# Patient Record
Sex: MALE | Employment: UNEMPLOYED | URBAN - METROPOLITAN AREA
[De-identification: names, ages, dates, MRNs, and addresses within clinical notes are randomized per-mention and may not be internally consistent; named-entity substitution may affect disease eponyms.]

---

## 2017-10-12 ENCOUNTER — TRANSFERRED RECORDS (OUTPATIENT)
Dept: HEALTH INFORMATION MANAGEMENT | Facility: CLINIC | Age: 62
End: 2017-10-12

## 2017-12-15 ENCOUNTER — TRANSFERRED RECORDS (OUTPATIENT)
Dept: HEALTH INFORMATION MANAGEMENT | Facility: CLINIC | Age: 62
End: 2017-12-15

## 2017-12-28 ENCOUNTER — TRANSFERRED RECORDS (OUTPATIENT)
Dept: HEALTH INFORMATION MANAGEMENT | Facility: CLINIC | Age: 62
End: 2017-12-28

## 2019-02-25 ENCOUNTER — TRANSFERRED RECORDS (OUTPATIENT)
Dept: HEALTH INFORMATION MANAGEMENT | Facility: CLINIC | Age: 64
End: 2019-02-25

## 2019-03-12 ENCOUNTER — TRANSFERRED RECORDS (OUTPATIENT)
Dept: HEALTH INFORMATION MANAGEMENT | Facility: CLINIC | Age: 64
End: 2019-03-12

## 2019-04-24 ENCOUNTER — TRANSFERRED RECORDS (OUTPATIENT)
Dept: HEALTH INFORMATION MANAGEMENT | Facility: CLINIC | Age: 64
End: 2019-04-24

## 2019-04-26 ENCOUNTER — PRE VISIT (OUTPATIENT)
Dept: UROLOGY | Facility: CLINIC | Age: 64
End: 2019-04-26

## 2019-04-26 NOTE — TELEPHONE ENCOUNTER
MEDICAL RECORDS REQUEST   [facility]  Urology Clinic  [address] [place] [address]  PHONE: [phone]  Fax: [phone]        FUTURE VISIT INFORMATION                                                   Reinaldo Delacruz : 1955 scheduled for future visit at [facility]    APPOINTMENT INFORMATION:    Date: 19 8:20AM    Provider:  Arthur Mederos MD    Reason for Visit/Diagnosis: Bladder Stone    REFERRAL INFORMATION:    Referring provider:  Elias Su    Specialty: MD    Referring providers clinic:  [facility] contact number:  [phone]    RECORDS REQUESTED FOR VISIT                                                     NOTES  STATUS/DETAILS   OFFICE NOTE from referring provider  Yes   OFFICE NOTE from other specialist  Yes   DISCHARGE SUMMARY from hospital  No   DISCHARGE REPORT from the ER  No   OPERATIVE REPORT  No   MEDICATION LIST  Yes     PRE-VISIT CHECKLIST      Record collection complete Yes- Blanco recs in [facility]   Recs received from [facility]  Images in [facility] PACS    Appointment appropriately scheduled           (right time/right provider) Yes   MyChart activation If no, please explain: In process   Questionnaire complete If no, please explain: In process     Completed by: Andreea Wheeler

## 2019-04-30 ENCOUNTER — DOCUMENTATION ONLY (OUTPATIENT)
Dept: CARE COORDINATION | Facility: CLINIC | Age: 64
End: 2019-04-30

## 2019-05-02 ENCOUNTER — TELEPHONE (OUTPATIENT)
Dept: UROLOGY | Facility: CLINIC | Age: 64
End: 2019-05-02

## 2019-05-02 NOTE — TELEPHONE ENCOUNTER
Spoke to patients wife to inform her that Dr. Mederos does do HOLEP surgery.    Cathryn Cee RN, BSN  Care Coordinator- Reconstructive Urology

## 2019-05-02 NOTE — TELEPHONE ENCOUNTER
[facility] Call Center    Phone Message    May a detailed message be left on voicemail: yes    Reason for Call: Other: Pt's wife called to see if Dr. Mederos does holed laser enucleation of prostate. The pt was previously seen at [facility], and they do not offer the procedure. He currently has an appointment scheduled with Dr. Mederos scheduled for 05/17.    Action Taken: Message routed to:  [facility] ([facility] Urology

## 2019-05-17 ENCOUNTER — OFFICE VISIT (OUTPATIENT)
Dept: UROLOGY | Facility: CLINIC | Age: 64
End: 2019-05-17
Payer: COMMERCIAL

## 2019-05-17 VITALS
SYSTOLIC BLOOD PRESSURE: 137 MMHG | HEART RATE: 83 BPM | HEIGHT: 71 IN | BODY MASS INDEX: 35.42 KG/M2 | DIASTOLIC BLOOD PRESSURE: 90 MMHG | WEIGHT: 253 LBS

## 2019-05-17 DIAGNOSIS — R97.20 ELEVATED PROSTATE SPECIFIC ANTIGEN (PSA): Primary | ICD-10-CM

## 2019-05-17 DIAGNOSIS — Q61.3 POLYCYSTIC KIDNEY: ICD-10-CM

## 2019-05-17 LAB
ALBUMIN UR-MCNC: NEGATIVE MG/DL
APPEARANCE UR: ABNORMAL
BILIRUB UR QL STRIP: NEGATIVE
COLOR UR AUTO: YELLOW
GLUCOSE UR STRIP-MCNC: NEGATIVE MG/DL
HGB UR QL STRIP: ABNORMAL
KETONES UR STRIP-MCNC: NEGATIVE MG/DL
LEUKOCYTE ESTERASE UR QL STRIP: NEGATIVE
MUCOUS THREADS #/AREA URNS LPF: PRESENT /LPF
NITRATE UR QL: NEGATIVE
PH UR STRIP: 5 PH (ref 5–7)
RBC #/AREA URNS AUTO: 65 /HPF (ref 0–2)
SOURCE: ABNORMAL
SP GR UR STRIP: 1.02 (ref 1–1.03)
UROBILINOGEN UR STRIP-MCNC: 0 MG/DL (ref 0–2)
WBC #/AREA URNS AUTO: 2 /HPF (ref 0–5)

## 2019-05-17 RX ORDER — TAMSULOSIN HYDROCHLORIDE 0.4 MG/1
0.4 CAPSULE ORAL DAILY
COMMUNITY
Start: 2016-12-19 | End: 2019-07-23

## 2019-05-17 ASSESSMENT — ENCOUNTER SYMPTOMS
HYPERTENSION: 0
FLANK PAIN: 1
TROUBLE SWALLOWING: 0
DIFFICULTY URINATING: 1
HYPOTENSION: 0
BLOOD IN STOOL: 0
HEMATURIA: 1
WHEEZING: 0
SPUTUM PRODUCTION: 0
ABDOMINAL PAIN: 0
SHORTNESS OF BREATH: 1
HEARTBURN: 0
RECTAL PAIN: 0
SINUS CONGESTION: 0
DYSURIA: 1
HEMOPTYSIS: 0
VOMITING: 0
SYNCOPE: 0
JAUNDICE: 0
SORE THROAT: 0
DIARRHEA: 0
COUGH DISTURBING SLEEP: 0
NAUSEA: 1
HOARSE VOICE: 0
BOWEL INCONTINENCE: 0
SNORES LOUDLY: 1
SINUS PAIN: 0
EXERCISE INTOLERANCE: 0
LEG PAIN: 0
SLEEP DISTURBANCES DUE TO BREATHING: 1
ORTHOPNEA: 1
POSTURAL DYSPNEA: 1
COUGH: 0
DYSPNEA ON EXERTION: 0
BLOATING: 0
PALPITATIONS: 0
CONSTIPATION: 0
LIGHT-HEADEDNESS: 0

## 2019-05-17 ASSESSMENT — PAIN SCALES - GENERAL: PAINLEVEL: MILD PAIN (3)

## 2019-05-17 ASSESSMENT — MIFFLIN-ST. JEOR: SCORE: 1964.73

## 2019-05-17 NOTE — PATIENT INSTRUCTIONS
Schedule an MRI.    Dr. Mederos will call you with the results.    It was a pleasure meeting with you today.  Thank you for allowing me and my team the privilege of caring for you today.  YOU are the reason we are here, and I truly hope we provided you with the excellent service you deserve.  Please let us know if there is anything else we can do for you so that we can be sure you are leaving completely satisfied with your care experience.

## 2019-05-17 NOTE — PROGRESS NOTES
Chief Complaint:   BPH/Hematuria         History of Present Illness:    Reinaldo Delacruz is a very pleasant 64 year old male who presents with a history of large gland BPH, hematuria and bladder stone.  He has had intermittant hematuria and a known bladder stone since 12/2017.  He had a cystolitholopaxy which treated his initial symptoms around that time and initially did well but stone and hematuria recurred 1/19.  Was reworked up and found to have a 115 gm prostate as well as a 1.5 cm bladder stone, suspected to be a recurrence.  Now being referred for consideration of HoLEP/Cystolitholopaxy.    PSA has historically ranged from 6's - 7's.   Has never had a prostate biopsy, denies family hx of prostate cancer.      He is on flomax.  Daytime symptoms are not very bothersome but he does have nocturia and in the evening often struggles with hesitancy/retention that prevents him from getting rest.  Does have intermittent bouts of dysuria.    Mother has hx of polycystic kidneys and he does as well.  Has never discussed this with anyone.      He is interested in the most definitive option available to address his large gland BPH and symptoms both in the short and long term.    AUA SS 16 (Did not answer QOL)         Past Medical History:   Polycystic Kidneys         Past Surgical History:   None         Medications     Current Outpatient Medications   Medication     tamsulosin (FLOMAX) 0.4 MG capsule     No current facility-administered medications for this visit.             Family History:   Kidney problem in father (congenital unknown)  Mother with polycystic kidneys         Social History:     Social History     Socioeconomic History     Marital status:      Spouse name: Not on file     Number of children: Not on file     Years of education: Not on file     Highest education level: Not on file   Occupational History     Not on file   Social Needs     Financial resource strain: Not on file     Food  "insecurity:     Worry: Not on file     Inability: Not on file     Transportation needs:     Medical: Not on file     Non-medical: Not on file   Tobacco Use     Smoking status: Never Smoker     Smokeless tobacco: Never Used   Substance and Sexual Activity     Alcohol use: Not Currently     Drug use: Never     Sexual activity: Not on file   Lifestyle     Physical activity:     Days per week: Not on file     Minutes per session: Not on file     Stress: Not on file   Relationships     Social connections:     Talks on phone: Not on file     Gets together: Not on file     Attends Hoahaoism service: Not on file     Active member of club or organization: Not on file     Attends meetings of clubs or organizations: Not on file     Relationship status: Not on file     Intimate partner violence:     Fear of current or ex partner: Not on file     Emotionally abused: Not on file     Physically abused: Not on file     Forced sexual activity: Not on file   Other Topics Concern     Parent/sibling w/ CABG, MI or angioplasty before 65F 55M? No   Social History Narrative     Not on file            Allergies:   Patient has no known allergies.         Review of Systems:  From intake questionnaire   Negative 14 system review except as noted on HPI, nurse's note.         Physical Exam:   Patient is a 64 year old  male   Vitals: Blood pressure 137/90, pulse 83, height 1.803 m (5' 11\"), weight 114.8 kg (253 lb).  General Appearance Adult: Alert, no acute distress, oriented  HENT: throat/mouth:normal, good dentition  Neck: No adenopathy,masses or thyromegaly  Lungs: no respiratory distress, or pursed lip breathing  Heart: No obvious jugular venous distension present  Abdomen: soft, nontender, no organomegaly or masses, Body mass index is 35.29 kg/m .  Lymphatics: No cervical or supraclavicular adenopathy  Musculoskeltal: extremities normal, no peripheral edema  Skin: no suspicious lesions or rashes  Neuro: Alert, oriented, speech and " mentation normal  Psych: affect and mood normal  Gait: Normal  :Prostate > 60 gm, soft anodular     Uroflow and Post-Void Residual by Bladder Scan   PVR: 16      Labs and Pathology:    I personally reviewed all applicable laboratory data and went over findings with patient  Significant for:        Imaging:    I personally reviewed all applicable imaging and went over findings with patient.  Significant for:  Urine cytology 4/19 negative for high grade cells  Cr 3.19 - 1.0           Assessment and Plan:     Assessment:64 year old male with bothersome LUTS and lagre prostate with bladder stone    Plan:  After discussion of medical and surgical treatments for BPH including alpha blockers, anticholinergics, transurethral resection, laser ablation, enucleation and simple prostatectomy, Mr. Delacruz has decided to proceed with cystolitholopaxy and Holmium Laser Enucleation of the Prostate (HoLEP).    We discussed the associated risks of this procedure included but not limited to the following:  -Bleeding, potentially significant enough to require clot evacuation and blood transfusion  -Infection, for which we will plan to treat preoperatively based on targeted antibiotic therapy  -Damage to the bladder, urethra and penis including the risk of urethral stricture and bladder neck contracture  -Risk of incidentally discovered prostate cancer.  We discussed that this would not preclude him from further therapy though it could prolong recovery and potentially increase risk of complications associated with cancer treatment.  Further preoperative workup to assess for prostate cancer prior to surgery was offered but patient deferred.  -Risk of retrograde ejaculation which would be expected to occur in the majority if not all men after HoLEP  -Risk of urinary incontinence.  We discussed that in the majority of men this is a transient process that is generally self limited to the first 6-12 weeks after surgery though could take  longer to resolve depending on baseline bladder instability.  -Risk of postperative urinary retention though in published series HoLEP has been found to be associated with high success rates of achieving spontaneous voiding even in men with underactive and atonic bladders.  -We will proceed with preoperative clearance with preference to minimize all anticoagulation as deemed acceptable by his primary care provider.     We will obtain a prostate MRI prior, if there is evidence of a suspicious lesion would obtain prostate biopsy prior to proceeding with surgery    Suspect also that he may have genetic polycystic kidney disease (ADPKD), will obtian MRA brain to rule out berry aneurysm.          IArthur saw and evaluated this patient and agree with the plan as stated above.  I personally performed all listed procedures.     CC:  No primary care provider on file.      Answers for HPI/ROS submitted by the patient on 5/17/2019   General Symptoms: No  Skin Symptoms: No  HENT Symptoms: Yes  EYE SYMPTOMS: No  HEART SYMPTOMS: Yes  LUNG SYMPTOMS: Yes  INTESTINAL SYMPTOMS: Yes  URINARY SYMPTOMS: Yes  REPRODUCTIVE SYMPTOMS: No  SKELETAL SYMPTOMS: No  BLOOD SYMPTOMS: No  NERVOUS SYSTEM SYMPTOMS: No  MENTAL HEALTH SYMPTOMS: No  Ear pain: No  Ear discharge: No  Hearing loss: No  Tinnitus: No  Nosebleeds: No  Congestion: No  Sinus pain: No  Trouble swallowing: No   Voice hoarseness: No  Mouth sores: No  Sore throat: No  Tooth pain: No  Gum tenderness: No  Bleeding gums: No  Cough: No  Sputum or phlegm: No  Coughing up blood: No  Difficulty breating or shortness of breath: Yes  Snoring: Yes  Wheezing: No  Difficulty breathing on exertion: No  Nighttime Cough: No  Difficulty breathing when lying flat: Yes  Chest pain or pressure: No  Fast or irregular heartbeat: No  Pain in legs with walking: No  Trouble breathing while lying down: Yes  Fingers or toes appear blue: No  High blood pressure: No  Low blood pressure:  No  Fainting: No  Murmurs: No  Pacemaker: No  Varicose veins: No  Edema or swelling: Yes  Wake up at night with shortness of breath: Yes  Light-headedness: No  Exercise intolerance: No  Heart burn or indigestion: No  Nausea: Yes  Vomiting: No  Abdominal pain: No  Bloating: No  Constipation: No  Diarrhea: No  Blood in stool: No  Black stools: No  Rectal or Anal pain: No  Fecal incontinence: No  Yellowing of skin or eyes: No  Vomit with blood: No  Change in stools: No  Trouble holding urine or incontinence: No  Pain or burning: Yes  Trouble starting or stopping: No  Increased frequency of urination: Yes  Blood in urine: Yes  Decreased frequency of urination: No  Frequent nighttime urination: Yes  Flank pain: Yes  Difficulty emptying bladder: Yes

## 2019-05-17 NOTE — LETTER
5/17/2019     RE: Reinaldo Delacruz  [address] [place] [address]     Dear Colleague,    Thank you for referring your patient, Reinaldo Delacruz, to the [facility] AND [facility] at [facility]. Please see a copy of my visit note below.            Chief Complaint:   BPH/Hematuria         History of Present Illness:    Reinaldo Delacruz is a very pleasant 64 year old male who presents with a history of large gland BPH, hematuria and bladder stone.  He has had intermittant hematuria and a known bladder stone since 12/2017.  He had a cystolitholopaxy which treated his initial symptoms around that time and initially did well but stone and hematuria recurred 1/19.  Was reworked up and found to have a 115 gm prostate as well as a 1.5 cm bladder stone, suspected to be a recurrence.  Now being referred for consideration of HoLEP/Cystolitholopaxy.    PSA has historically ranged from 6's - 7's.   Has never had a prostate biopsy, denies family hx of prostate cancer.      He is on flomax.  Daytime symptoms are not very bothersome but he does have nocturia and in the evening often struggles with hesitancy/retention that prevents him from getting rest.  Does have intermittent bouts of dysuria.    Mother has hx of polycystic kidneys and he does as well.  Has never discussed this with anyone.      He is interested in the most definitive option available to address his large gland BPH and symptoms both in the short and long term.    AUA SS 16 (Did not answer QOL)         Past Medical History:   Polycystic Kidneys         Past Surgical History:   None         Medications     Current Outpatient Medications   Medication     tamsulosin (FLOMAX) 0.4 MG capsule     No current facility-administered medications for this visit.             Family History:   Kidney problem in father (congenital unknown)  Mother with polycystic kidneys         Social History:     Social History  "    Socioeconomic History     Marital status:      Spouse name: Not on file     Number of children: Not on file     Years of education: Not on file     Highest education level: Not on file   Occupational History     Not on file   Social Needs     Financial resource strain: Not on file     Food insecurity:     Worry: Not on file     Inability: Not on file     Transportation needs:     Medical: Not on file     Non-medical: Not on file   Tobacco Use     Smoking status: Never Smoker     Smokeless tobacco: Never Used   Substance and Sexual Activity     Alcohol use: Not Currently     Drug use: Never     Sexual activity: Not on file   Lifestyle     Physical activity:     Days per week: Not on file     Minutes per session: Not on file     Stress: Not on file   Relationships     Social connections:     Talks on phone: Not on file     Gets together: Not on file     Attends Bahai service: Not on file     Active member of club or organization: Not on file     Attends meetings of clubs or organizations: Not on file     Relationship status: Not on file     Intimate partner violence:     Fear of current or ex partner: Not on file     Emotionally abused: Not on file     Physically abused: Not on file     Forced sexual activity: Not on file   Other Topics Concern     Parent/sibling w/ CABG, MI or angioplasty before 65F 55M? No   Social History Narrative     Not on file            Allergies:   Patient has no known allergies.         Review of Systems:  From intake questionnaire   Negative 14 system review except as noted on HPI, nurse's note.         Physical Exam:   Patient is a 64 year old  male   Vitals: Blood pressure 137/90, pulse 83, height 1.803 m (5' 11\"), weight 114.8 kg (253 lb).  General Appearance Adult: Alert, no acute distress, oriented  HENT: throat/mouth:normal, good dentition  Neck: No adenopathy,masses or thyromegaly  Lungs: no respiratory distress, or pursed lip breathing  Heart: No obvious jugular " venous distension present  Abdomen: soft, nontender, no organomegaly or masses, Body mass index is 35.29 kg/m .  Lymphatics: No cervical or supraclavicular adenopathy  Musculoskeltal: extremities normal, no peripheral edema  Skin: no suspicious lesions or rashes  Neuro: Alert, oriented, speech and mentation normal  Psych: affect and mood normal  Gait: Normal  :Prostate > 60 gm, soft anodular     Uroflow and Post-Void Residual by Bladder Scan   PVR: 16      Labs and Pathology:    I personally reviewed all applicable laboratory data and went over findings with patient  Significant for:        Imaging:    I personally reviewed all applicable imaging and went over findings with patient.  Significant for:  Urine cytology 4/19 negative for high grade cells  Cr 3.19 - 1.0           Assessment and Plan:     Assessment:64 year old male with bothersome LUTS and lagre prostate with bladder stone    Plan:  After discussion of medical and surgical treatments for BPH including alpha blockers, anticholinergics, transurethral resection, laser ablation, enucleation and simple prostatectomy, Mr. Delacruz has decided to proceed with cystolitholopaxy and Holmium Laser Enucleation of the Prostate (HoLEP).    We discussed the associated risks of this procedure included but not limited to the following:  -Bleeding, potentially significant enough to require clot evacuation and blood transfusion  -Infection, for which we will plan to treat preoperatively based on targeted antibiotic therapy  -Damage to the bladder, urethra and penis including the risk of urethral stricture and bladder neck contracture  -Risk of incidentally discovered prostate cancer.  We discussed that this would not preclude him from further therapy though it could prolong recovery and potentially increase risk of complications associated with cancer treatment.  Further preoperative workup to assess for prostate cancer prior to surgery was offered but patient  deferred.  -Risk of retrograde ejaculation which would be expected to occur in the majority if not all men after HoLEP  -Risk of urinary incontinence.  We discussed that in the majority of men this is a transient process that is generally self limited to the first 6-12 weeks after surgery though could take longer to resolve depending on baseline bladder instability.  -Risk of postperative urinary retention though in published series HoLEP has been found to be associated with high success rates of achieving spontaneous voiding even in men with underactive and atonic bladders.  -We will proceed with preoperative clearance with preference to minimize all anticoagulation as deemed acceptable by his primary care provider.     We will obtain a prostate MRI prior, if there is evidence of a suspicious lesion would obtain prostate biopsy prior to proceeding with surgery    Suspect also that he may have genetic polycystic kidney disease (ADPKD), will obtian MRA brain to rule out berry aneurysm.          IArthur saw and evaluated this patient and agree with the plan as stated above.  I personally performed all listed procedures.     CC:  No primary care provider on file.      Answers for HPI/ROS submitted by the patient on 5/17/2019   General Symptoms: No  Skin Symptoms: No  HENT Symptoms: Yes  EYE SYMPTOMS: No  HEART SYMPTOMS: Yes  LUNG SYMPTOMS: Yes  INTESTINAL SYMPTOMS: Yes  URINARY SYMPTOMS: Yes  REPRODUCTIVE SYMPTOMS: No  SKELETAL SYMPTOMS: No  BLOOD SYMPTOMS: No  NERVOUS SYSTEM SYMPTOMS: No  MENTAL HEALTH SYMPTOMS: No  Ear pain: No  Ear discharge: No  Hearing loss: No  Tinnitus: No  Nosebleeds: No  Congestion: No  Sinus pain: No  Trouble swallowing: No   Voice hoarseness: No  Mouth sores: No  Sore throat: No  Tooth pain: No  Gum tenderness: No  Bleeding gums: No  Cough: No  Sputum or phlegm: No  Coughing up blood: No  Difficulty breating or shortness of breath: Yes  Snoring: Yes  Wheezing: No  Difficulty  breathing on exertion: No  Nighttime Cough: No  Difficulty breathing when lying flat: Yes  Chest pain or pressure: No  Fast or irregular heartbeat: No  Pain in legs with walking: No  Trouble breathing while lying down: Yes  Fingers or toes appear blue: No  High blood pressure: No  Low blood pressure: No  Fainting: No  Murmurs: No  Pacemaker: No  Varicose veins: No  Edema or swelling: Yes  Wake up at night with shortness of breath: Yes  Light-headedness: No  Exercise intolerance: No  Heart burn or indigestion: No  Nausea: Yes  Vomiting: No  Abdominal pain: No  Bloating: No  Constipation: No  Diarrhea: No  Blood in stool: No  Black stools: No  Rectal or Anal pain: No  Fecal incontinence: No  Yellowing of skin or eyes: No  Vomit with blood: No  Change in stools: No  Trouble holding urine or incontinence: No  Pain or burning: Yes  Trouble starting or stopping: No  Increased frequency of urination: Yes  Blood in urine: Yes  Decreased frequency of urination: No  Frequent nighttime urination: Yes  Flank pain: Yes  Difficulty emptying bladder: Yes    Again, thank you for allowing me to participate in the care of your patient.      Sincerely,    Arthur Mederos MD

## 2019-05-17 NOTE — NURSING NOTE
"No chief complaint on file.      Blood pressure 137/90, pulse 83, height 1.803 m (5' 11\"), weight 114.8 kg (253 lb). Body mass index is 35.29 kg/m .    There is no problem list on file for this patient.      No Known Allergies    Current Outpatient Medications   Medication Sig Dispense Refill     tamsulosin (FLOMAX) 0.4 MG capsule Take 0.4 mg by mouth         Social History     Tobacco Use     Smoking status: Never Smoker     Smokeless tobacco: Never Used   Substance Use Topics     Alcohol use: Not Currently     Drug use: Never       CHIOMA Sanchez  5/17/2019  8:06 AM     "

## 2019-05-20 ENCOUNTER — HOSPITAL ENCOUNTER (OUTPATIENT)
Dept: MRI IMAGING | Facility: CLINIC | Age: 64
Discharge: HOME | End: 2019-05-20
Attending: UROLOGY | Admitting: UROLOGY
Payer: COMMERCIAL

## 2019-05-20 DIAGNOSIS — N21.0 BLADDER STONE: Primary | ICD-10-CM

## 2019-05-20 DIAGNOSIS — R97.20 ELEVATED PROSTATE SPECIFIC ANTIGEN (PSA): ICD-10-CM

## 2019-05-20 PROCEDURE — A9585 GADOBUTROL INJECTION: HCPCS | Performed by: RADIOLOGY

## 2019-05-20 PROCEDURE — 72197 MRI PELVIS W/O & W/DYE: CPT

## 2019-05-20 PROCEDURE — 25500064 ZZH RX 255 OP 636: Performed by: RADIOLOGY

## 2019-05-20 RX ORDER — GADOBUTROL 604.72 MG/ML
10 INJECTION INTRAVENOUS ONCE
Status: COMPLETED | OUTPATIENT
Start: 2019-05-20 | End: 2019-05-20

## 2019-05-20 RX ADMIN — GADOBUTROL 10 ML: 604.72 INJECTION INTRAVENOUS at 15:29

## 2019-05-21 ENCOUNTER — TELEPHONE (OUTPATIENT)
Dept: UROLOGY | Facility: CLINIC | Age: 64
End: 2019-05-21

## 2019-05-21 NOTE — TELEPHONE ENCOUNTER
Patient is scheduled for surgery with Dr. Diaz      Spoke or left message with: spouse    Date of Surgery: 6/6/19    Location: [facility] OR    Informed patient they will need an adult  yes    Pre-op with surgeon (if applicable): n/a    H&P: Scheduled with pcp    Additional imaging/appointments: n/a    Surgery packet: mailed 5/21/19     Additional comments: n/a

## 2019-05-22 ENCOUNTER — TRANSFERRED RECORDS (OUTPATIENT)
Dept: HEALTH INFORMATION MANAGEMENT | Facility: CLINIC | Age: 64
End: 2019-05-22

## 2019-05-22 ENCOUNTER — MYC MEDICAL ADVICE (OUTPATIENT)
Dept: UROLOGY | Facility: CLINIC | Age: 64
End: 2019-05-22

## 2019-05-23 ENCOUNTER — PATIENT OUTREACH (OUTPATIENT)
Dept: UROLOGY | Facility: CLINIC | Age: 64
End: 2019-05-23

## 2019-05-23 NOTE — PROGRESS NOTES
Pre-op watch for results.      Hayden Kasper,   Yesterday night  I  had appointment with Dr Leoncio Wood / [facility].

## 2019-05-31 DIAGNOSIS — N39.0 URINARY TRACT INFECTION: Primary | ICD-10-CM

## 2019-05-31 RX ORDER — CIPROFLOXACIN 500 MG/1
500 TABLET, FILM COATED ORAL 2 TIMES DAILY
Qty: 14 TABLET | Refills: 0 | Status: ON HOLD | OUTPATIENT
Start: 2019-05-31 | End: 2019-06-07

## 2019-06-06 ENCOUNTER — ANESTHESIA EVENT (OUTPATIENT)
Dept: SURGERY | Facility: CLINIC | Age: 64
End: 2019-06-06
Payer: COMMERCIAL

## 2019-06-06 ENCOUNTER — HOSPITAL ENCOUNTER (OUTPATIENT)
Facility: CLINIC | Age: 64
Discharge: HOME | End: 2019-06-07
Attending: UROLOGY | Admitting: UROLOGY
Payer: COMMERCIAL

## 2019-06-06 ENCOUNTER — ANESTHESIA (OUTPATIENT)
Dept: SURGERY | Facility: CLINIC | Age: 64
End: 2019-06-06
Payer: COMMERCIAL

## 2019-06-06 DIAGNOSIS — N39.0 URINARY TRACT INFECTION: ICD-10-CM

## 2019-06-06 DIAGNOSIS — R39.14 BENIGN PROSTATIC HYPERPLASIA WITH INCOMPLETE BLADDER EMPTYING: Primary | ICD-10-CM

## 2019-06-06 DIAGNOSIS — N40.1 BENIGN PROSTATIC HYPERPLASIA WITH INCOMPLETE BLADDER EMPTYING: Primary | ICD-10-CM

## 2019-06-06 PROBLEM — N40.0 BPH (BENIGN PROSTATIC HYPERPLASIA): Status: ACTIVE | Noted: 2019-06-06

## 2019-06-06 LAB
ABO + RH BLD: NORMAL
ABO + RH BLD: NORMAL
ANION GAP SERPL CALCULATED.3IONS-SCNC: 6 MMOL/L (ref 3–14)
BLD GP AB SCN SERPL QL: NORMAL
BLOOD BANK CMNT PATIENT-IMP: NORMAL
BUN SERPL-MCNC: 23 MG/DL (ref 7–30)
CALCIUM SERPL-MCNC: 8.3 MG/DL (ref 8.5–10.1)
CHLORIDE SERPL-SCNC: 110 MMOL/L (ref 94–109)
CO2 SERPL-SCNC: 25 MMOL/L (ref 20–32)
CREAT SERPL-MCNC: 1.13 MG/DL (ref 0.66–1.25)
ERYTHROCYTE [DISTWIDTH] IN BLOOD BY AUTOMATED COUNT: 13.6 % (ref 10–15)
GFR SERPL CREATININE-BSD FRML MDRD: 69 ML/MIN/{1.73_M2}
GLUCOSE BLDC GLUCOMTR-MCNC: 100 MG/DL (ref 70–99)
GLUCOSE SERPL-MCNC: 120 MG/DL (ref 70–99)
HCT VFR BLD AUTO: 37 % (ref 40–53)
HGB BLD-MCNC: 12.4 G/DL (ref 13.3–17.7)
MCH RBC QN AUTO: 29.9 PG (ref 26.5–33)
MCHC RBC AUTO-ENTMCNC: 33.5 G/DL (ref 31.5–36.5)
MCV RBC AUTO: 89 FL (ref 78–100)
PLATELET # BLD AUTO: 217 10E9/L (ref 150–450)
POTASSIUM SERPL-SCNC: 4.5 MMOL/L (ref 3.4–5.3)
RBC # BLD AUTO: 4.15 10E12/L (ref 4.4–5.9)
SODIUM SERPL-SCNC: 141 MMOL/L (ref 133–144)
SPECIMEN EXP DATE BLD: NORMAL
WBC # BLD AUTO: 9.5 10E9/L (ref 4–11)

## 2019-06-06 PROCEDURE — 88305 TISSUE EXAM BY PATHOLOGIST: CPT | Performed by: UROLOGY

## 2019-06-06 PROCEDURE — 86901 BLOOD TYPING SEROLOGIC RH(D): CPT | Performed by: UROLOGY

## 2019-06-06 PROCEDURE — 25800030 ZZH RX IP 258 OP 636: Performed by: NURSE ANESTHETIST, CERTIFIED REGISTERED

## 2019-06-06 PROCEDURE — 25000125 ZZHC RX 250: Performed by: NURSE ANESTHETIST, CERTIFIED REGISTERED

## 2019-06-06 PROCEDURE — 71000015 ZZH RECOVERY PHASE 1 LEVEL 2 EA ADDTL HR: Performed by: UROLOGY

## 2019-06-06 PROCEDURE — 25800030 ZZH RX IP 258 OP 636: Performed by: UROLOGY

## 2019-06-06 PROCEDURE — 37000009 ZZH ANESTHESIA TECHNICAL FEE, EACH ADDTL 15 MIN: Performed by: UROLOGY

## 2019-06-06 PROCEDURE — 82962 GLUCOSE BLOOD TEST: CPT

## 2019-06-06 PROCEDURE — 36000062 ZZH SURGERY LEVEL 4 1ST 30 MIN - UMMC: Performed by: UROLOGY

## 2019-06-06 PROCEDURE — 86900 BLOOD TYPING SEROLOGIC ABO: CPT | Performed by: UROLOGY

## 2019-06-06 PROCEDURE — 82365 CALCULUS SPECTROSCOPY: CPT | Performed by: UROLOGY

## 2019-06-06 PROCEDURE — 36000064 ZZH SURGERY LEVEL 4 EA 15 ADDTL MIN - UMMC: Performed by: UROLOGY

## 2019-06-06 PROCEDURE — 25000128 H RX IP 250 OP 636: Performed by: NURSE ANESTHETIST, CERTIFIED REGISTERED

## 2019-06-06 PROCEDURE — 27210794 ZZH OR GENERAL SUPPLY STERILE: Performed by: UROLOGY

## 2019-06-06 PROCEDURE — 25000566 ZZH SEVOFLURANE, EA 15 MIN: Performed by: UROLOGY

## 2019-06-06 PROCEDURE — 88305 TISSUE EXAM BY PATHOLOGIST: CPT | Mod: 26 | Performed by: UROLOGY

## 2019-06-06 PROCEDURE — 25000128 H RX IP 250 OP 636: Performed by: ANESTHESIOLOGY

## 2019-06-06 PROCEDURE — 37000008 ZZH ANESTHESIA TECHNICAL FEE, 1ST 30 MIN: Performed by: UROLOGY

## 2019-06-06 PROCEDURE — 85027 COMPLETE CBC AUTOMATED: CPT | Performed by: STUDENT IN AN ORGANIZED HEALTH CARE EDUCATION/TRAINING PROGRAM

## 2019-06-06 PROCEDURE — 25000125 ZZHC RX 250: Performed by: UROLOGY

## 2019-06-06 PROCEDURE — 36415 COLL VENOUS BLD VENIPUNCTURE: CPT | Performed by: STUDENT IN AN ORGANIZED HEALTH CARE EDUCATION/TRAINING PROGRAM

## 2019-06-06 PROCEDURE — 25000128 H RX IP 250 OP 636: Performed by: UROLOGY

## 2019-06-06 PROCEDURE — 86850 RBC ANTIBODY SCREEN: CPT | Performed by: UROLOGY

## 2019-06-06 PROCEDURE — C1758 CATHETER, URETERAL: HCPCS | Performed by: UROLOGY

## 2019-06-06 PROCEDURE — 36415 COLL VENOUS BLD VENIPUNCTURE: CPT | Performed by: UROLOGY

## 2019-06-06 PROCEDURE — 71000014 ZZH RECOVERY PHASE 1 LEVEL 2 FIRST HR: Performed by: UROLOGY

## 2019-06-06 PROCEDURE — 80048 BASIC METABOLIC PNL TOTAL CA: CPT | Performed by: STUDENT IN AN ORGANIZED HEALTH CARE EDUCATION/TRAINING PROGRAM

## 2019-06-06 PROCEDURE — 40000170 ZZH STATISTIC PRE-PROCEDURE ASSESSMENT II: Performed by: UROLOGY

## 2019-06-06 RX ORDER — ACETAMINOPHEN 325 MG/1
650 TABLET ORAL EVERY 6 HOURS PRN
Status: DISCONTINUED | OUTPATIENT
Start: 2019-06-06 | End: 2019-06-07 | Stop reason: HOSPADM

## 2019-06-06 RX ORDER — SODIUM CHLORIDE, SODIUM LACTATE, POTASSIUM CHLORIDE, CALCIUM CHLORIDE 600; 310; 30; 20 MG/100ML; MG/100ML; MG/100ML; MG/100ML
INJECTION, SOLUTION INTRAVENOUS CONTINUOUS PRN
Status: DISCONTINUED | OUTPATIENT
Start: 2019-06-06 | End: 2019-06-06

## 2019-06-06 RX ORDER — ONDANSETRON 4 MG/1
4 TABLET, ORALLY DISINTEGRATING ORAL EVERY 6 HOURS PRN
Status: DISCONTINUED | OUTPATIENT
Start: 2019-06-06 | End: 2019-06-07 | Stop reason: HOSPADM

## 2019-06-06 RX ORDER — SODIUM CHLORIDE, SODIUM LACTATE, POTASSIUM CHLORIDE, CALCIUM CHLORIDE 600; 310; 30; 20 MG/100ML; MG/100ML; MG/100ML; MG/100ML
INJECTION, SOLUTION INTRAVENOUS CONTINUOUS
Status: DISCONTINUED | OUTPATIENT
Start: 2019-06-06 | End: 2019-06-06 | Stop reason: HOSPADM

## 2019-06-06 RX ORDER — ONDANSETRON 2 MG/ML
4 INJECTION INTRAMUSCULAR; INTRAVENOUS EVERY 6 HOURS PRN
Status: DISCONTINUED | OUTPATIENT
Start: 2019-06-06 | End: 2019-06-07 | Stop reason: HOSPADM

## 2019-06-06 RX ORDER — PROPOFOL 10 MG/ML
INJECTION, EMULSION INTRAVENOUS PRN
Status: DISCONTINUED | OUTPATIENT
Start: 2019-06-06 | End: 2019-06-06

## 2019-06-06 RX ORDER — LIDOCAINE HYDROCHLORIDE 20 MG/ML
INJECTION, SOLUTION INFILTRATION; PERINEURAL PRN
Status: DISCONTINUED | OUTPATIENT
Start: 2019-06-06 | End: 2019-06-06

## 2019-06-06 RX ORDER — LIDOCAINE 50 MG/G
OINTMENT TOPICAL 4 TIMES DAILY PRN
Status: DISCONTINUED | OUTPATIENT
Start: 2019-06-06 | End: 2019-06-07 | Stop reason: HOSPADM

## 2019-06-06 RX ORDER — NALOXONE HYDROCHLORIDE 0.4 MG/ML
.1-.4 INJECTION, SOLUTION INTRAMUSCULAR; INTRAVENOUS; SUBCUTANEOUS
Status: DISCONTINUED | OUTPATIENT
Start: 2019-06-06 | End: 2019-06-06 | Stop reason: HOSPADM

## 2019-06-06 RX ORDER — ONDANSETRON 2 MG/ML
INJECTION INTRAMUSCULAR; INTRAVENOUS PRN
Status: DISCONTINUED | OUTPATIENT
Start: 2019-06-06 | End: 2019-06-06

## 2019-06-06 RX ORDER — ONDANSETRON 2 MG/ML
4 INJECTION INTRAMUSCULAR; INTRAVENOUS EVERY 30 MIN PRN
Status: DISCONTINUED | OUTPATIENT
Start: 2019-06-06 | End: 2019-06-06 | Stop reason: HOSPADM

## 2019-06-06 RX ORDER — DEXAMETHASONE SODIUM PHOSPHATE 4 MG/ML
INJECTION, SOLUTION INTRA-ARTICULAR; INTRALESIONAL; INTRAMUSCULAR; INTRAVENOUS; SOFT TISSUE PRN
Status: DISCONTINUED | OUTPATIENT
Start: 2019-06-06 | End: 2019-06-06

## 2019-06-06 RX ORDER — HYDROMORPHONE HYDROCHLORIDE 1 MG/ML
0.2 INJECTION, SOLUTION INTRAMUSCULAR; INTRAVENOUS; SUBCUTANEOUS
Status: DISCONTINUED | OUTPATIENT
Start: 2019-06-06 | End: 2019-06-07 | Stop reason: HOSPADM

## 2019-06-06 RX ORDER — TAMSULOSIN HYDROCHLORIDE 0.4 MG/1
0.4 CAPSULE ORAL DAILY
Status: DISCONTINUED | OUTPATIENT
Start: 2019-06-07 | End: 2019-06-07 | Stop reason: HOSPADM

## 2019-06-06 RX ORDER — CIPROFLOXACIN 500 MG/1
500 TABLET, FILM COATED ORAL 2 TIMES DAILY
Status: DISCONTINUED | OUTPATIENT
Start: 2019-06-07 | End: 2019-06-07 | Stop reason: HOSPADM

## 2019-06-06 RX ORDER — SODIUM CHLORIDE, SODIUM LACTATE, POTASSIUM CHLORIDE, CALCIUM CHLORIDE 600; 310; 30; 20 MG/100ML; MG/100ML; MG/100ML; MG/100ML
INJECTION, SOLUTION INTRAVENOUS CONTINUOUS
Status: DISCONTINUED | OUTPATIENT
Start: 2019-06-06 | End: 2019-06-07 | Stop reason: HOSPADM

## 2019-06-06 RX ORDER — OXYCODONE HYDROCHLORIDE 5 MG/1
5-10 TABLET ORAL
Status: DISCONTINUED | OUTPATIENT
Start: 2019-06-06 | End: 2019-06-07 | Stop reason: HOSPADM

## 2019-06-06 RX ORDER — ATROPA BELLADONNA AND OPIUM 16.2; 3 MG/1; MG/1
30 SUPPOSITORY RECTAL 3 TIMES DAILY PRN
Status: DISCONTINUED | OUTPATIENT
Start: 2019-06-06 | End: 2019-06-07 | Stop reason: HOSPADM

## 2019-06-06 RX ORDER — NALOXONE HYDROCHLORIDE 0.4 MG/ML
.1-.4 INJECTION, SOLUTION INTRAMUSCULAR; INTRAVENOUS; SUBCUTANEOUS
Status: DISCONTINUED | OUTPATIENT
Start: 2019-06-06 | End: 2019-06-07 | Stop reason: HOSPADM

## 2019-06-06 RX ORDER — CEFAZOLIN SODIUM 1 G/3ML
1 INJECTION, POWDER, FOR SOLUTION INTRAMUSCULAR; INTRAVENOUS SEE ADMIN INSTRUCTIONS
Status: DISCONTINUED | OUTPATIENT
Start: 2019-06-06 | End: 2019-06-06 | Stop reason: HOSPADM

## 2019-06-06 RX ORDER — FENTANYL CITRATE 50 UG/ML
INJECTION, SOLUTION INTRAMUSCULAR; INTRAVENOUS PRN
Status: DISCONTINUED | OUTPATIENT
Start: 2019-06-06 | End: 2019-06-06

## 2019-06-06 RX ORDER — NEOMYCIN/BACITRACIN/POLYMYXINB 3.5-400-5K
OINTMENT (GRAM) TOPICAL 4 TIMES DAILY PRN
Status: DISCONTINUED | OUTPATIENT
Start: 2019-06-06 | End: 2019-06-07 | Stop reason: HOSPADM

## 2019-06-06 RX ORDER — CEFAZOLIN SODIUM 2 G/100ML
2 INJECTION, SOLUTION INTRAVENOUS
Status: DISCONTINUED | OUTPATIENT
Start: 2019-06-06 | End: 2019-06-06

## 2019-06-06 RX ORDER — FENTANYL CITRATE 50 UG/ML
25-50 INJECTION, SOLUTION INTRAMUSCULAR; INTRAVENOUS EVERY 5 MIN PRN
Status: DISCONTINUED | OUTPATIENT
Start: 2019-06-06 | End: 2019-06-06 | Stop reason: HOSPADM

## 2019-06-06 RX ORDER — HYDROMORPHONE HYDROCHLORIDE 1 MG/ML
.3-.5 INJECTION, SOLUTION INTRAMUSCULAR; INTRAVENOUS; SUBCUTANEOUS EVERY 10 MIN PRN
Status: DISCONTINUED | OUTPATIENT
Start: 2019-06-06 | End: 2019-06-06 | Stop reason: HOSPADM

## 2019-06-06 RX ORDER — ONDANSETRON 4 MG/1
4 TABLET, ORALLY DISINTEGRATING ORAL EVERY 30 MIN PRN
Status: DISCONTINUED | OUTPATIENT
Start: 2019-06-06 | End: 2019-06-06 | Stop reason: HOSPADM

## 2019-06-06 RX ORDER — CEFAZOLIN SODIUM 2 G/100ML
2 INJECTION, SOLUTION INTRAVENOUS EVERY 8 HOURS
Status: DISCONTINUED | OUTPATIENT
Start: 2019-06-07 | End: 2019-06-07 | Stop reason: HOSPADM

## 2019-06-06 RX ORDER — LIDOCAINE 40 MG/G
CREAM TOPICAL
Status: DISCONTINUED | OUTPATIENT
Start: 2019-06-06 | End: 2019-06-07 | Stop reason: HOSPADM

## 2019-06-06 RX ADMIN — FENTANYL CITRATE 25 MCG: 50 INJECTION, SOLUTION INTRAMUSCULAR; INTRAVENOUS at 18:27

## 2019-06-06 RX ADMIN — HYDROMORPHONE HYDROCHLORIDE 0.3 MG: 1 INJECTION, SOLUTION INTRAMUSCULAR; INTRAVENOUS; SUBCUTANEOUS at 19:02

## 2019-06-06 RX ADMIN — SODIUM CHLORIDE, POTASSIUM CHLORIDE, SODIUM LACTATE AND CALCIUM CHLORIDE: 600; 310; 30; 20 INJECTION, SOLUTION INTRAVENOUS at 18:12

## 2019-06-06 RX ADMIN — FENTANYL CITRATE 25 MCG: 50 INJECTION, SOLUTION INTRAMUSCULAR; INTRAVENOUS at 18:39

## 2019-06-06 RX ADMIN — CEFAZOLIN SODIUM 2 G: 2 INJECTION, SOLUTION INTRAVENOUS at 18:22

## 2019-06-06 RX ADMIN — DEXAMETHASONE SODIUM PHOSPHATE 4 MG: 4 INJECTION, SOLUTION INTRAMUSCULAR; INTRAVENOUS at 18:12

## 2019-06-06 RX ADMIN — ONDANSETRON 4 MG: 2 INJECTION INTRAMUSCULAR; INTRAVENOUS at 18:31

## 2019-06-06 RX ADMIN — SODIUM CHLORIDE 1 G: 9 INJECTION, SOLUTION INTRAVENOUS at 18:22

## 2019-06-06 RX ADMIN — PROPOFOL 20 MG: 10 INJECTION, EMULSION INTRAVENOUS at 18:27

## 2019-06-06 RX ADMIN — HYDROMORPHONE HYDROCHLORIDE 0.5 MG: 1 INJECTION, SOLUTION INTRAMUSCULAR; INTRAVENOUS; SUBCUTANEOUS at 21:02

## 2019-06-06 RX ADMIN — HYDROMORPHONE HYDROCHLORIDE 0.2 MG: 1 INJECTION, SOLUTION INTRAMUSCULAR; INTRAVENOUS; SUBCUTANEOUS at 18:59

## 2019-06-06 RX ADMIN — LIDOCAINE HYDROCHLORIDE 100 MG: 20 INJECTION, SOLUTION INFILTRATION; PERINEURAL at 18:12

## 2019-06-06 RX ADMIN — PROPOFOL 200 MG: 10 INJECTION, EMULSION INTRAVENOUS at 18:12

## 2019-06-06 RX ADMIN — MIDAZOLAM 2 MG: 1 INJECTION INTRAMUSCULAR; INTRAVENOUS at 18:12

## 2019-06-06 ASSESSMENT — MIFFLIN-ST. JEOR: SCORE: 1661.13

## 2019-06-06 NOTE — ANESTHESIA PREPROCEDURE EVALUATION
Anesthesia Pre-Procedure Evaluation    Patient: Reinaldo Delacruz   MRN:     [id number] Gender:   male   Age:    64 year old :      1955        Preoperative Diagnosis: Bladder Stone   Procedure(s):  Cystolithopaxy with Holmium Laser Enucleation Of The Prostate     Past Medical History:   Diagnosis Date     BPH (benign prostatic hyperplasia)      Kidney stones       History reviewed. No pertinent surgical history.       Anesthesia Evaluation     . Pt has had prior anesthetic.     No history of anesthetic complications          ROS/MED HX    ENT/Pulmonary:  - neg pulmonary ROS     Neurologic:  - neg neurologic ROS     Cardiovascular:  - neg cardiovascular ROS       METS/Exercise Tolerance:     Hematologic:  - neg hematologic  ROS       Musculoskeletal:  - neg musculoskeletal ROS       GI/Hepatic:  - neg GI/hepatic ROS       Renal/Genitourinary:     (+) chronic renal disease,       Endo:  - neg endo ROS       Psychiatric:  - neg psychiatric ROS       Infectious Disease:  - neg infectious disease ROS       Malignancy:         Other:                         PHYSICAL EXAM:   Mental Status/Neuro: A/A/O   Airway: Facies: Feasible  Mallampati: II  Mouth/Opening: Full  TM distance: > 6 cm  Neck ROM: Full   Respiratory:    CV:    Comments:                    No results found for: WBC, HGB, HCT, PLT, CRP, SED, NA, POTASSIUM, CHLORIDE, CO2, BUN, CR, GLC, JEAN MARIE, PHOS, MAG, ALBUMIN, PROTTOTAL, ALT, AST, GGT, ALKPHOS, BILITOTAL, BILIDIRECT, LIPASE, AMYLASE, CESAR, PTT, INR, FIBR, TSH, T4, T3, HCG, HCGS, CKTOTAL, CKMB, TROPN    Preop Vitals  BP Readings from Last 3 Encounters:   19 (!) 153/107   19 137/90    Pulse Readings from Last 3 Encounters:   19 75   19 83      Resp Readings from Last 3 Encounters:   19 18    SpO2 Readings from Last 3 Encounters:   19 99%      Temp Readings from Last 1 Encounters:   19 36.6  C (97.9  F) (Oral)    Ht Readings from Last 1 Encounters:   19 1.803 m  "(5' 11\")      Wt Readings from Last 1 Encounters:   06/06/19 84.4 kg (186 lb 1.1 oz)    Estimated body mass index is 25.95 kg/m  as calculated from the following:    Height as of this encounter: 1.803 m (5' 11\").    Weight as of this encounter: 84.4 kg (186 lb 1.1 oz).     LDA:  Peripheral IV 06/06/19 Left Hand (Active)   Site Assessment WDL 6/6/2019  2:45 PM   Line Status Saline locked 6/6/2019  2:45 PM   Phlebitis Scale 0-->no symptoms 6/6/2019  2:45 PM   Infiltration Scale 0 6/6/2019  2:45 PM   Number of days: 0            Assessment:   ASA SCORE: 2    NPO Status: > 2 hours since completed Clear Liquids; > 6 hours since completed Solid Foods   Documentation: H&P complete; Preop Testing complete; Consents complete   Proceeding: Proceed without further delay  Tobacco Use:  NO Active use of Tobacco/UNKNOWN Tobacco use status     Plan:   Anes. Type:  General   Pre-Induction: Midazolam IV; Acetaminophen PO   Induction:  IV (Standard)   Airway: LMA   Access/Monitoring: PIV   Maintenance: Balanced   Emergence: Procedure Site   Logistics: Same Day Surgery     Postop Pain/Sedation Strategy:  Standard-Options: Opioids PRN     PONV Management:  Adult Risk Factors:, Non-Smoker, Postop Opioids     CONSENT: Direct conversation   Plan and risks discussed with: Patient                            Lorelei Stokes MD  "

## 2019-06-07 VITALS
SYSTOLIC BLOOD PRESSURE: 128 MMHG | HEART RATE: 63 BPM | HEIGHT: 71 IN | BODY MASS INDEX: 26.05 KG/M2 | OXYGEN SATURATION: 98 % | TEMPERATURE: 97.4 F | WEIGHT: 186.07 LBS | DIASTOLIC BLOOD PRESSURE: 80 MMHG | RESPIRATION RATE: 17 BRPM

## 2019-06-07 LAB
ANION GAP SERPL CALCULATED.3IONS-SCNC: 3 MMOL/L (ref 3–14)
BUN SERPL-MCNC: 19 MG/DL (ref 7–30)
CALCIUM SERPL-MCNC: 7.7 MG/DL (ref 8.5–10.1)
CHLORIDE SERPL-SCNC: 108 MMOL/L (ref 94–109)
CO2 SERPL-SCNC: 27 MMOL/L (ref 20–32)
CREAT SERPL-MCNC: 0.91 MG/DL (ref 0.66–1.25)
ERYTHROCYTE [DISTWIDTH] IN BLOOD BY AUTOMATED COUNT: 13.4 % (ref 10–15)
GFR SERPL CREATININE-BSD FRML MDRD: 89 ML/MIN/{1.73_M2}
GLUCOSE SERPL-MCNC: 118 MG/DL (ref 70–99)
HCT VFR BLD AUTO: 35.4 % (ref 40–53)
HGB BLD-MCNC: 11.4 G/DL (ref 13.3–17.7)
MCH RBC QN AUTO: 29.2 PG (ref 26.5–33)
MCHC RBC AUTO-ENTMCNC: 32.2 G/DL (ref 31.5–36.5)
MCV RBC AUTO: 91 FL (ref 78–100)
PLATELET # BLD AUTO: 215 10E9/L (ref 150–450)
POTASSIUM SERPL-SCNC: 4.2 MMOL/L (ref 3.4–5.3)
RBC # BLD AUTO: 3.91 10E12/L (ref 4.4–5.9)
SODIUM SERPL-SCNC: 138 MMOL/L (ref 133–144)
WBC # BLD AUTO: 7.9 10E9/L (ref 4–11)

## 2019-06-07 PROCEDURE — 25800025 ZZH RX 258: Performed by: STUDENT IN AN ORGANIZED HEALTH CARE EDUCATION/TRAINING PROGRAM

## 2019-06-07 PROCEDURE — 36415 COLL VENOUS BLD VENIPUNCTURE: CPT | Performed by: STUDENT IN AN ORGANIZED HEALTH CARE EDUCATION/TRAINING PROGRAM

## 2019-06-07 PROCEDURE — 25000128 H RX IP 250 OP 636: Performed by: STUDENT IN AN ORGANIZED HEALTH CARE EDUCATION/TRAINING PROGRAM

## 2019-06-07 PROCEDURE — 25800030 ZZH RX IP 258 OP 636: Performed by: STUDENT IN AN ORGANIZED HEALTH CARE EDUCATION/TRAINING PROGRAM

## 2019-06-07 PROCEDURE — 85027 COMPLETE CBC AUTOMATED: CPT | Performed by: STUDENT IN AN ORGANIZED HEALTH CARE EDUCATION/TRAINING PROGRAM

## 2019-06-07 PROCEDURE — 80048 BASIC METABOLIC PNL TOTAL CA: CPT | Performed by: STUDENT IN AN ORGANIZED HEALTH CARE EDUCATION/TRAINING PROGRAM

## 2019-06-07 PROCEDURE — 25000132 ZZH RX MED GY IP 250 OP 250 PS 637: Performed by: STUDENT IN AN ORGANIZED HEALTH CARE EDUCATION/TRAINING PROGRAM

## 2019-06-07 RX ORDER — CIPROFLOXACIN 500 MG/1
500 TABLET, FILM COATED ORAL 2 TIMES DAILY
Qty: 14 TABLET | Refills: 0 | Status: SHIPPED | OUTPATIENT
Start: 2019-06-07 | End: 2019-07-23

## 2019-06-07 RX ADMIN — TAMSULOSIN HYDROCHLORIDE 0.4 MG: 0.4 CAPSULE ORAL at 08:07

## 2019-06-07 RX ADMIN — CEFAZOLIN SODIUM 2 G: 2 INJECTION, SOLUTION INTRAVENOUS at 11:22

## 2019-06-07 RX ADMIN — CIPROFLOXACIN HYDROCHLORIDE 500 MG: 500 TABLET, FILM COATED ORAL at 08:07

## 2019-06-07 RX ADMIN — SODIUM CHLORIDE, POTASSIUM CHLORIDE, SODIUM LACTATE AND CALCIUM CHLORIDE: 600; 310; 30; 20 INJECTION, SOLUTION INTRAVENOUS at 00:13

## 2019-06-07 RX ADMIN — SODIUM CHLORIDE: 900 IRRIGANT IRRIGATION at 00:29

## 2019-06-07 RX ADMIN — CEFAZOLIN SODIUM 2 G: 2 INJECTION, SOLUTION INTRAVENOUS at 02:24

## 2019-06-07 NOTE — PLAN OF CARE
VSS. Pt ambulating independently. Leon and CBI discontinued early am. Pt spontaneously voiding red urine. PVRs of 109 and 9mL. Pt denies pain. Tolerating regular diet well. Pt able to make needs known.     Pt. discharged at 1410 to home, was accompanied by his wife, and left with all personal belongings. Pt. received complete discharge paperwork and medications as filled by discharge pharmacy. Pt. was given times of last dose for all discharge medications in writing on discharge medication sheets. Discharge teaching included new medication, pain management, activity restrictions, and signs and symptoms of infection. Pt. to follow up with Dr. Mederos on July 23rd. Pt. had no further questions at the time of discharge and no unmet needs were identified.

## 2019-06-07 NOTE — OP NOTE
Operative Report  6/6/2019    PREOPERATIVE DIAGNOSIS:  Prostatic hypertrophy with lower urinary tract symptoms and recurrent bladder stones  POSTOPERATIVE DIAGNOSIS: Same as above    PROCEDURE PERFORMED: Cystolitholopaxy, Holmium laser enucleation of the prostate  ATTENDING SURGEON: Arthur Mederos MD  RESIDENT SURGEON: Qasim Santillan MD    FINDINGS: Approximately 150 gm prostate with trilobar hypertrophy. Bladder with moderate trabeculation  ANESTHESIA: General  INTRAVENOUS FLUIDS: See anesthesia records  ESTIMATED BLOOD LOSS: 200 ml   SPECIMENS: Prostate adenoma  DRAINS: 22-Latvian 3-way catheter with 75 ml in balloon     INDICATIONS FOR PROCEDURE: Reinaldo Delacruz is a(n) 64 year old male who was seen in consultation for urinary retention. He has elected treatment with laser enucleation. Prostate volume estimated to be 150 grams. His digital rectal exam was unremarkable.  After discussion of the risks, benefits and alternatives of the procedure, the patient agreed to proceed with the above stated procdure.    DESCRIPTION OF PROCEDURE: After obtaining informed consent, the patient was taken to the operating room and placed under general anesthesia.  He was repositioned in dorsal lithotomy making sure that the legs were positioned and padded safely.  He was then prepped and draped in standard sterile fashion.  Culture directed antibiotics were administered and bilateral sequential compression devices were placed.  A time out was performed confirming the appropriate patient identity and planned procedure.     The procedure was begun by generously lubricating the urethra.  The urethra was noted to be patent and did not require use of the isabella urethrotome in order to place the 26F outer sheath.  The outer sheath was placed over a deflecting obturator and we then looked the scope through the posterior urethra and into the bladder.  The prostate was noted to have a trilobar configuration.  At this point we inserted the 550  micron holmium laser through the 7F laser catheter.    We started by performing a cystolitholopaxt on the solitary 2.5 cm bladder stone.  We fragmented all pieces using a setting of 1J and 50 Hz.  We then flushed out all pieces using a urovac.    We began enucleation by making a 5 o'clock incision.  We carried this incision down to the prostatic capsule from the bladder neck towards the apex just proximal to the veromontanum.  We then proceeded with a bottom up  approach, electing to enucleate the left lobe first.  We performed the majority of the dissection at 40 larios making sure to keep the energy at 40 larios or lower when working near the apex.  The capsular planes on this side were noted to be sticky.  Once the majority of the lobe was dissected we isolated the mucosal strip and transected it with the laser at 40 larios.  We then proceeded to take down the remaining lateral and posterior attachments and pushed the lobe into the bladder.  We then enucleated the contralateral lobe using a top down approach.  Capsular planes were noted to be better on this side.  The remaining bladder neck attachments were identified and transected, freeing the lobe up entirely. Total enucleation time was 65 minutes.    At this point there was a moderate amount of bleeding and approximately 10 minutes were spent identifying bleeding vessels in the fossa and coagulating them with the laser.  Once hemostasis was adequate we switched from the laser resectoscope to the 26F offset telescope with the Piranha morcellation device.  We added an extra inflow to distend the bladder.  The blades were adjusted and set at a rate of 1500 RPM.  We proceeded with morcellation making sure that we morcellated the entirety of the adenoma.  Total morcellation time was 12 minutes.     We then switched back to the laser resectoscope one final time to ensure that no residual tissue was left in the bladder.  We also confirmed that the bladder was unharmed  from morcellation and that both ureteral orifices were unharmed during the procedure.  We inspected the fossa and obtained final hemostasis.   We looked the scope out noting that the sphincter was completely intact without evidence of thermal or mechanical injury.     We lubricated the urethra again and passed a 22F 3-way irving catheter over a catheter guide.  The urine was noted to be pink.  We filled the balloon with 75 ml of sterile water and did not place the catheter on traction.  The patient was woken from anesthesia and taken to the recovery room in stable condition.     The specimen was weighed and found to be approximately 140 g.     POSTOPERATIVE PLAN:   -We will monitor the patient post operatively on continuous bladder irrigation for signs of ongling bleeding.  If clear we will consider discharge with irving removal as an outpatient.  If continues to have ongoing bleeding concerning for clot formation without bladder irrigation will plan to admit for observation    Arthur Mederos

## 2019-06-07 NOTE — ANESTHESIA POSTPROCEDURE EVALUATION
Anesthesia POST Procedure Evaluation    Patient: Reinaldo Delacruz   MRN:     [id number] Gender:   male   Age:    64 year old :      1955        Preoperative Diagnosis: Bladder Stone   Procedure(s):  Cystolithopaxy with Holmium Laser Enucleation Of The Prostate   Postop Comments: No value filed.       Anesthesia Type:  General  No value filed.    Reportable Event: NO     PAIN: Uncomplicated   Sign Out status: Comfortable, Well controlled pain     PONV: No PONV   Sign Out status:  No Nausea or Vomiting     Neuro/Psych: Uneventful perioperative course   Sign Out Status: Preoperative baseline; Age appropriate mentation     Airway/Resp.: Uneventful perioperative course   Sign Out Status: Non labored breathing, age appropriate RR; Resp. Status within EXPECTED Parameters     CV: Uneventful perioperative course   Sign Out status: Appropriate BP and perfusion indices; Appropriate HR/Rhythm     Disposition:   Sign Out in:  PACU  Disposition:  Floor  Recovery Course: Uneventful  Follow-Up: Not required           Last Anesthesia Record Vitals:  CRNA VITALS  2019 2011 - 2019 2111      2019             Pulse:  66    SpO2:  99 %          Last PACU Vitals:  Vitals Value Taken Time   /89 2019 11:30 PM   Temp 36.3  C (97.4  F) 2019 11:25 PM   Pulse 55 2019 11:30 PM   Resp 14 2019 11:25 PM   SpO2 96 % 2019 11:41 PM   Temp src     NIBP     Pulse     SpO2     Resp     Temp     Ht Rate     Temp 2     Vitals shown include unvalidated device data.      Electronically Signed By: Lorelei Stokes MD, 2019, 11:42 PM

## 2019-06-07 NOTE — PLAN OF CARE
Pt arrived to the unit at 2310 via cart from the PACU. Pt arrived accompanied by PACU staff and with personal belongings. Pt was able to walk from cart to be with assist of 1.       VS:   Temp: 97.4  F (36.3  C) Temp src: Oral BP: 128/80 Pulse: 51 Heart Rate: 58 Resp: 14 SpO2: 98 % O2 Device: None (Room air)   Bradycardic - pt states this is baseline - urology aware.   Vitals otherwise stable.   Lung sounds are clear.    Output:   CBI running at moderate rate upon arrival - titrated to slow overnight to maintain a clear light pink output.    Activity:   Independently repositioning in bed.    Skin: Intact   Pain:   Denies pain   Neuro/CMS:   Intact. Denies numbness/tingling. Alert and oriented X4.    Dressing(s):   Dressing to surgical site is clean/dry/intact.    Diet:   Regular    LDA:   PIV infusing LR at 100mL/hr   Equipment:   IV pole, PCD's    Plan:   Discharge plan to be determined.    Additional Info:   Able to make needs known. Will continue with plan of care.

## 2019-06-07 NOTE — OR NURSING
Pt up to the bathroom for a BM, unsuccessful. Monitors replaced, pt is A&Ox4, COLIN, no c/o lightheadedness or dizziness, no numbness or tingling. CBI running, cherry red color.

## 2019-06-07 NOTE — OR NURSING
PACU to Inpatient Nursing Handoff    Patient Reinaldo Delacruz is a 64 year old male who speaks English.   Procedure Procedure(s):  Cystolithopaxy with Holmium Laser Enucleation Of The Prostate   Surgeon(s) Primary: Arthur Mederos MD  Resident - Assisting: Qasim Gant MD     No Known Allergies    Isolation  [unfilled]     Past Medical History   has a past medical history of BPH (benign prostatic hyperplasia) and Kidney stones.    Anesthesia General   Dermatome Level     Preop Meds Not applicable   Nerve block Not applicable   Intraop Meds dexamethasone (Decadron)  fentanyl (Sublimaze): 50 mcg total  hydromorphone (Dilaudid): 0.5 mg total  ondansetron (Zofran): last given at 1831   Local Meds No   Antibiotics cefazolin (Ancef) - last given at 1822     Pain Patient Currently in Pain: denies  Comfort: comfortably manageable  Pain Control: partially effective   PACU meds  hydromorphone (Dilaudid): 0.5 mg (total dose) last given at 2102    PCA / epidural No   Capnography     Telemetry ECG Rhythm: Sinus rhythm   Inpatient Telemetry Monitor Ordered? No        Labs Glucose Lab Results   Component Value Date     06/06/2019       Hgb Lab Results   Component Value Date    HGB 12.4 06/06/2019       INR No results found for: INR   PACU Imaging Not applicable     Wound/Incision Incision/Surgical Site 06/06/19 Penis (Active)   Incision Assessment UTV 6/6/2019  8:43 PM   Incision Drainage Amount None 6/6/2019  8:43 PM   Dressing Intervention Clean, dry, intact 6/6/2019  8:43 PM   Number of days: 0      CMS        Equipment capnography- none available in [facility] or on our unit at this time   Other LDA       IV Access Peripheral IV 06/06/19 Left Hand (Active)   Site Assessment WDL 6/6/2019 10:00 PM   Line Status Infusing 6/6/2019 10:00 PM   Phlebitis Scale 0-->no symptoms 6/6/2019 10:00 PM   Infiltration Scale 0 6/6/2019  2:45 PM   Number of days: 0      Blood Products Not applicable  mL   Intake/Output Date 06/06/19 0700  - 06/07/19 0659   Shift 6632-9758 4609-4122 9934-8558 24 Hour Total   INTAKE   P.O.  630  630   I.V.  600  600   Shift Total(mL/kg)  1230(14.57)  1230(14.57)   OUTPUT   Urine  3250  3250   Blood  200  200   Shift Total(mL/kg)  3450(40.88)  3450(40.88)   Weight (kg) 84.4 84.4 84.4 84.4      Drains / Leon Urethral Catheter Double-lumen 22 fr (Active)   Catheter Care Done 6/6/2019  8:43 PM   Collection Container Standard 6/6/2019  8:43 PM   Securement Method Securing device (Describe) 6/6/2019  8:43 PM   Rationale for Continued Use Strict 1-2 Hour I&O 6/6/2019  8:43 PM   Number of days: 0      Time of void PreOp Void Prior to Procedure: 1612 (06/06/19 1612)    PostOp Urine Occurrence: 1 (06/06/19 1612)    Diapered? No   Bladder Scan      mL (06/06/19 2209)  tolerating sips     Vitals    B/P: (!) 163/99  T: 97.9  F (36.6  C)    Temp src: Oral  P:  Pulse: 61 (06/06/19 2145)    Heart Rate: 56 (06/06/19 2145)     R: 12  O2:  SpO2: 95 %    O2 Device: None (Room air) (06/06/19 2130)    Oxygen Delivery: 6 LPM (06/06/19 2043)         Family/support present spouse is at home   Patient belongings     Patient transported on cart and air mat   DC meds/scripts (obs/outpt) Not applicable   Inpatient Pain Meds Released? Yes       Special needs/considerations pt up to bathroom for a BM attempt, unsuccessful   Tasks needing completion None       Mitzy Winkler, AVANI  ASCOM [id number]

## 2019-06-07 NOTE — ANESTHESIA CARE TRANSFER NOTE
Patient: Reinaldo Delacruz    Procedure(s):  Cystolithopaxy with Holmium Laser Enucleation Of The Prostate    Diagnosis: Bladder Stone  Diagnosis Additional Information: No value filed.    Anesthesia Type:   No value filed.     Note:  Airway :Face Mask  Patient transferred to:PACU  Handoff Report: Identifed the Patient, Identified the Reponsible Provider, Reviewed the pertinent medical history, Discussed the surgical course, Reviewed Intra-OP anesthesia mangement and issues during anesthesia, Set expectations for post-procedure period and Allowed opportunity for questions and acknowledgement of understanding      Vitals: (Last set prior to Anesthesia Care Transfer)    CRNA VITALS  6/6/2019 2011 - 6/6/2019 2051 6/6/2019             Pulse:  66    SpO2:  99 %                Electronically Signed By: SHAMAR Weaver CRNA  June 6, 2019  8:51 PM

## 2019-06-07 NOTE — PROGRESS NOTES
"Urology Daily Progress Note    24 hour events/Subjective:     - No acute events overnight   - Pain well controlled on current regimen   - Tolerating regular diet ; no nausea or vomiting   - Not yet ambulating.    O:  Vitals: /80 (BP Location: Right arm)   Pulse 63   Temp 97.4  F (36.3  C) (Oral)   Resp 17   Ht 1.803 m (5' 11\")   Wt 84.4 kg (186 lb 1.1 oz)   SpO2 98%   BMI 25.95 kg/m     - Temp (24hrs), Av.7  F (36.5  C), Min:97.4  F (36.3  C), Max:97.9  F (36.6  C)      General: Alert, interactive, in NAD  Resp: Non-labored breathing on RA  Abdomen: Soft, nontender, nondistended  Ext: Warm and well perfused  CBI:  Moderate drip,watermelon appearance    Labs  Heme:  Recent Labs   Lab 19  0525 19   WBC 7.9 9.5   HGB 11.4* 12.4*    217     Chem:  Recent Labs   Lab 19  0525 19   POTASSIUM 4.2 4.5   CR 0.91 1.13     Assessment/Plan  64 year old y/o male POD#1 s/p HoLEP, for benign prostatic hypertrophy    NEURO Pain well controlled on  APAP with PRN Oxycodone and IV Dilaudid.  Changes:  None    CV HDS.    PULM Aggressive pulmonary toilet and I/S.   FEN/GI IVF:TKO IVF once tolerating regular diet  Diet: Regular diet    CBI clamped this AM, irrigated, and catheter removed. Record PVRs x2 today   HEME Hgb as above, stable on recheck   ID Afebrile, no leukocytosis.    Antibiotics: Completed periop antibiotics    ENDO No issues   ACTIVITY Ambulate at least TID    PPx SCDs, ambulation   DISPO 10A, likely discharge today pending spontaneous voiding, and reassuring PVRs x2.         To be discussed with Dr. Mederos    --  Qasim Gant G3  Urology Resident    "

## 2019-06-09 LAB
APPEARANCE STONE: NORMAL
COMPN STONE: NORMAL
NUMBER STONE: 5
SIZE STONE: NORMAL MM
WT STONE: 93 MG

## 2019-06-10 ENCOUNTER — PATIENT OUTREACH (OUTPATIENT)
Dept: UROLOGY | Facility: CLINIC | Age: 64
End: 2019-06-10

## 2019-06-10 LAB — COPATH REPORT: NORMAL

## 2019-06-11 NOTE — PROGRESS NOTES
POST OP~left 2 messages for patient to return my call.     Patient returned my call. He is doing great. He has no issues or concerns. He is very happy with the surgery result so far. BM are normal. Appetite is normal. Denies fever and chills. Urine is clear yellow. Denies pain.  Post op appt July 23rd at 9:40am

## 2019-07-03 ENCOUNTER — TRANSFERRED RECORDS (OUTPATIENT)
Dept: HEALTH INFORMATION MANAGEMENT | Facility: CLINIC | Age: 64
End: 2019-07-03

## 2019-07-18 ENCOUNTER — PRE VISIT (OUTPATIENT)
Dept: UROLOGY | Facility: CLINIC | Age: 64
End: 2019-07-18

## 2019-07-18 NOTE — TELEPHONE ENCOUNTER
Chief Complaint : Post Op-6 weeks    Hx/Sx: s/p HOLEP     Records/Orders: Available     Pt Contacted: N/A    At Rooming: Dip/PVR

## 2019-07-23 ENCOUNTER — OFFICE VISIT (OUTPATIENT)
Dept: UROLOGY | Facility: CLINIC | Age: 64
End: 2019-07-23
Payer: COMMERCIAL

## 2019-07-23 VITALS
DIASTOLIC BLOOD PRESSURE: 87 MMHG | BODY MASS INDEX: 26.64 KG/M2 | WEIGHT: 191 LBS | RESPIRATION RATE: 16 BRPM | HEART RATE: 77 BPM | SYSTOLIC BLOOD PRESSURE: 144 MMHG

## 2019-07-23 DIAGNOSIS — R35.0 BENIGN PROSTATIC HYPERPLASIA WITH URINARY FREQUENCY: ICD-10-CM

## 2019-07-23 DIAGNOSIS — N40.1 BENIGN PROSTATIC HYPERPLASIA WITH URINARY FREQUENCY: ICD-10-CM

## 2019-07-23 DIAGNOSIS — N40.1 BENIGN PROSTATIC HYPERPLASIA WITH URINARY FREQUENCY: Primary | ICD-10-CM

## 2019-07-23 DIAGNOSIS — R35.0 BENIGN PROSTATIC HYPERPLASIA WITH URINARY FREQUENCY: Primary | ICD-10-CM

## 2019-07-23 DIAGNOSIS — N28.1 ACQUIRED CYST OF KIDNEY: ICD-10-CM

## 2019-07-23 LAB
ALBUMIN UR-MCNC: 30 MG/DL
APPEARANCE UR: ABNORMAL
BILIRUB UR QL STRIP: NEGATIVE
CAOX CRY #/AREA URNS HPF: ABNORMAL /HPF
COLOR UR AUTO: YELLOW
GLUCOSE UR STRIP-MCNC: NEGATIVE MG/DL
HGB UR QL STRIP: ABNORMAL
KETONES UR STRIP-MCNC: NEGATIVE MG/DL
LEUKOCYTE ESTERASE UR QL STRIP: ABNORMAL
MUCOUS THREADS #/AREA URNS LPF: PRESENT /LPF
NITRATE UR QL: NEGATIVE
PH UR STRIP: 5 PH (ref 5–7)
PSA SERPL-MCNC: 1.23 UG/L (ref 0–4)
RBC #/AREA URNS AUTO: 75 /HPF (ref 0–2)
SOURCE: ABNORMAL
SP GR UR STRIP: 1.02 (ref 1–1.03)
SPERM #/AREA URNS HPF: PRESENT /HPF
SQUAMOUS #/AREA URNS AUTO: <1 /HPF (ref 0–1)
UROBILINOGEN UR STRIP-MCNC: 0 MG/DL (ref 0–2)
WBC #/AREA URNS AUTO: 37 /HPF (ref 0–5)

## 2019-07-23 PROCEDURE — 87086 URINE CULTURE/COLONY COUNT: CPT | Performed by: UROLOGY

## 2019-07-23 ASSESSMENT — PAIN SCALES - GENERAL: PAINLEVEL: MODERATE PAIN (4)

## 2019-07-23 NOTE — PATIENT INSTRUCTIONS
Schedule labs.  Consult with Dr. Langford.  Follow up with Dr. Mederos in one year.        It was a pleasure meeting with you today.  Thank you for allowing me and my team the privilege of caring for you today.  YOU are the reason we are here, and I truly hope we provided you with the excellent service you deserve.  Please let us know if there is anything else we can do for you so that we can be sure you are leaving completely satisfied with your care experience.

## 2019-07-23 NOTE — LETTER
2019       RE: Reinaldo Delacruz  [address] [facility] [id number]     Dear Colleague,    Thank you for referring your patient, Reinaldo Delacruz, to the [facility] UROLOGY AND INST FOR PROSTATE AND UROLOGIC CANCERS at [facility]. Please see a copy of my visit note below.      Urology Clinic    Arthur Mederos MD  Date of Service: 2019     Name: Reinaldo Delacruz  MRN: [id number]  Age: 64 year old  : 1955  Referring provider: Provider Not In System     Assessment and Plan:  Assessment:  Reinaldo Delacruz  is a 64 year old male with a history of BPH now status post holmium laser enucleation and doing well.  He also has multiple cysts seen on CT scan in both kidneys which likely represents polycystic kidney disease.  Renal function has been normal.    Plan:  - PSA and urinalysis today  - Referral to nephrology for evaluation of bilateral renal cysts and concern for PKD  - Follow-up in 1 year  ______________________________________________________________________    HPI  Today I had the pleasure of seeing Mr. Delacruz in follow-up for a history of benign prostatic hypertrophy.    He underwent holmium laser enucleation of the prostate approximately 6 weeks ago     86 gms of tissue were removed.  Pathology was benign hyperplastic prostate tissue.    Today he notes he has been doing well since surgery and his urinary flow is much improved.  He only had a couple days of leaking before this resolved completely.  He has had no hematuria.  Occasionally he has some urethral pain but overall this is gradually improving.    Uroflow/Post Void Residual  PVR 51 mL    AUA SS - 6/0     Review of Systems:   Pertinent items are noted in HPI or as below, remainder of complete ROS is negative.      Physical Exam:   Patient is a 64 year old  male   Vitals: Blood pressure 144/87, pulse 77, resp. rate 16, weight 86.6 kg (191 lb).  Notable Findings on Exam: Well-nourished male in no apparent  distress      Imaging:   I personally reviewed all applicable imaging and went over the below findings with patient.    Results for orders placed or performed during the hospital encounter of 05/20/19   MR Prostate wo & w Contrast    Narrative    MRI PROSTATE: 5/20/2019 3:32 PM    CLINICAL HISTORY: Prostate screen, PSA > 3, post repeat (PSA, CAROL,  benign workup); Elevated prostate specific antigen (PSA)    Most Recent PSA: Unknown ug/L    Comparison: CT 3/12/2019.    TECHNIQUE:  The following sequences were obtained: High-resolution axial  T2-weighted, coronal T2-weighted, 3D volumetric T2-weighted, axial  pre-contrast T1, axial diffusion-weighted, axial apparent diffusion  coefficient and axial dynamic contrast-enhanced T1. Postcontrast  images were evaluated on a separate workstation to evaluate dynamic  contrast enhancement. The technique of this exam is PI-RADS v2.1  compliant. Contrast dose: 10mL Gadavist    FINDINGS:  Size: 7.6 x 4.8 x 6.1 cm. 116 grams  Hemorrhage: Absent  Peripheral zone: Compressed and homogeneous on T2 signal on  T2-weighted images. No suspicious lesions identified.  Transition zone: Enlarged with BPH changes. Transition zone nodules  which are circumscribed or mostly encapsulated without diffusion  restriction.  PI-RADS 2.  No highly suspicious nodules.  Neurovascular bundles: No suspicion of involvement by malignancy  Seminal vesicles: Not involved by tumor.  Lymph nodes: No adenopathy.  Bones: No suspicious lesions.  Other pelvic organs: No additional findings.        Impression    IMPRESSION:  1. Based on the most suspicious abnormality, this exam is  characterized as PIRADS 2 - Clinically significant cancer is unlikely  to be present.    2. No suspicious adenopathy or evidence of pelvic metastases.      PIRADS? v2.1 Assessment Categories   PIRADS 1: Very low (clinically significant cancer is highly unlikely  to be present)   PIRADS 2: Low (clinically significant cancer is unlikely to  be  present)   PIRADS 3: Intermediate (the presence of clinically significant cancer  is equivocal)   PIRADS 4: High (clinically significant cancer is likely to be present)    PIRADS 5: Very high (clinically significant cancer is highly likely to  be present)          SATYA PLASCENCIA MD       Scribe Disclosure:  I, Marlen Vasquez, am serving as a scribe to document services personally performed by Arthur Mederos MD at this visit, based upon the provider's statements to me. All documentation has been reviewed by the aforementioned provider prior to being entered into the official medical record.     Again, thank you for allowing me to participate in the care of your patient.      Sincerely,    Arthur Mederos MD

## 2019-07-23 NOTE — PROGRESS NOTES
Urology Clinic    Arthur Mederos MD  Date of Service: 2019     Name: Reinaldo Delacruz  MRN: [id number]  Age: 64 year old  : 1955  Referring provider: Provider Not In System     Assessment and Plan:  Assessment:  Reinaldo Delacruz  is a 64 year old male with a history of BPH now status post holmium laser enucleation and doing well.  He also has multiple cysts seen on CT scan in both kidneys which likely represents polycystic kidney disease.  Renal function has been normal.    Plan:  - PSA and urinalysis today  - Referral to nephrology for evaluation of bilateral renal cysts and concern for PKD  - Follow-up in 1 year  ______________________________________________________________________    HPI  Today I had the pleasure of seeing Mr. Delacruz in follow-up for a history of benign prostatic hypertrophy.    He underwent holmium laser enucleation of the prostate approximately 6 weeks ago     86 gms of tissue were removed.  Pathology was benign hyperplastic prostate tissue.    Today he notes he has been doing well since surgery and his urinary flow is much improved.  He only had a couple days of leaking before this resolved completely.  He has had no hematuria.  Occasionally he has some urethral pain but overall this is gradually improving.    Uroflow/Post Void Residual  PVR 51 mL    AUA SS - 6/0     Review of Systems:   Pertinent items are noted in HPI or as below, remainder of complete ROS is negative.      Physical Exam:   Patient is a 64 year old  male   Vitals: Blood pressure 144/87, pulse 77, resp. rate 16, weight 86.6 kg (191 lb).  Notable Findings on Exam: Well-nourished male in no apparent distress      Imaging:   I personally reviewed all applicable imaging and went over the below findings with patient.    Results for orders placed or performed during the hospital encounter of 19   MR Prostate wo & w Contrast    Narrative    MRI PROSTATE: 2019 3:32 PM    CLINICAL HISTORY: Prostate screen, PSA >  3, post repeat (PSA, CAROL,  benign workup); Elevated prostate specific antigen (PSA)    Most Recent PSA: Unknown ug/L    Comparison: CT 3/12/2019.    TECHNIQUE:  The following sequences were obtained: High-resolution axial  T2-weighted, coronal T2-weighted, 3D volumetric T2-weighted, axial  pre-contrast T1, axial diffusion-weighted, axial apparent diffusion  coefficient and axial dynamic contrast-enhanced T1. Postcontrast  images were evaluated on a separate workstation to evaluate dynamic  contrast enhancement. The technique of this exam is PI-RADS v2.1  compliant. Contrast dose: 10mL Gadavist    FINDINGS:  Size: 7.6 x 4.8 x 6.1 cm. 116 grams  Hemorrhage: Absent  Peripheral zone: Compressed and homogeneous on T2 signal on  T2-weighted images. No suspicious lesions identified.  Transition zone: Enlarged with BPH changes. Transition zone nodules  which are circumscribed or mostly encapsulated without diffusion  restriction.  PI-RADS 2.  No highly suspicious nodules.  Neurovascular bundles: No suspicion of involvement by malignancy  Seminal vesicles: Not involved by tumor.  Lymph nodes: No adenopathy.  Bones: No suspicious lesions.  Other pelvic organs: No additional findings.        Impression    IMPRESSION:  1. Based on the most suspicious abnormality, this exam is  characterized as PIRADS 2 - Clinically significant cancer is unlikely  to be present.    2. No suspicious adenopathy or evidence of pelvic metastases.      PIRADS? v2.1 Assessment Categories   PIRADS 1: Very low (clinically significant cancer is highly unlikely  to be present)   PIRADS 2: Low (clinically significant cancer is unlikely to be  present)   PIRADS 3: Intermediate (the presence of clinically significant cancer  is equivocal)   PIRADS 4: High (clinically significant cancer is likely to be present)    PIRADS 5: Very high (clinically significant cancer is highly likely to  be present)          SATYA PLASCENCIA MD       Scribe Disclosure:  I,  Marlen Vasquez, am serving as a scribe to document services personally performed by Arthur Mederos MD at this visit, based upon the provider's statements to me. All documentation has been reviewed by the aforementioned provider prior to being entered into the official medical record.

## 2019-07-23 NOTE — NURSING NOTE
Chief Complaint   Patient presents with     Surgical Followup     Patient is here to follow up from surgery     Jamaica Kent CMA 9:05 AM on 7/23/2019.

## 2019-07-24 ENCOUNTER — TELEPHONE (OUTPATIENT)
Dept: NEPHROLOGY | Facility: CLINIC | Age: 64
End: 2019-07-24

## 2019-07-24 LAB
BACTERIA SPEC CULT: NO GROWTH
Lab: NORMAL
SPECIMEN SOURCE: NORMAL

## 2019-07-24 NOTE — TELEPHONE ENCOUNTER
[facility]    Phone Message    May a detailed message be left on voicemail: yes    Reason for Call: Other: Patient has a referral from an urologist to be seen in nephrology for acquired kidney cyst: Per protocols/guidelines, writer transferred patient to urology. Urology  states that while they do see for kidney cysts, the referring provider is an urologist so writer is confused on how to schedule this patient with the diagnosis and referral issues. Please clarify who to schedule with from re [facility] physician Rosa M Gibson and contact patient to accurately schedule his appointment. Thanks.     Action Taken: Message routed to:  [facility] nephrology/urology

## 2019-07-24 NOTE — TELEPHONE ENCOUNTER
Hi ladies,    Please contact patient to coordinate an about with Nephrology. Referral is place in chart by Dr. Arthur Mederos.      Thanks, Faby Renteria MA

## 2019-07-25 NOTE — TELEPHONE ENCOUNTER
[facility]    Phone Message    May a detailed message be left on voicemail: yes    Reason for Call: Other: returning call Patient returning call to Majo regarding scheduling with Dr. Miguel in Urology. Please call to discuss thank you.     Action Taken: Message routed to:  [facility] urology

## 2019-07-31 ENCOUNTER — MEDICAL CORRESPONDENCE (OUTPATIENT)
Dept: HEALTH INFORMATION MANAGEMENT | Facility: CLINIC | Age: 64
End: 2019-07-31

## 2019-07-31 ENCOUNTER — TELEPHONE (OUTPATIENT)
Dept: NEPHROLOGY | Facility: CLINIC | Age: 64
End: 2019-07-31

## 2019-08-09 ENCOUNTER — PRE VISIT (OUTPATIENT)
Dept: UROLOGY | Facility: CLINIC | Age: 64
End: 2019-08-09

## 2019-08-12 ENCOUNTER — TRANSFERRED RECORDS (OUTPATIENT)
Dept: HEALTH INFORMATION MANAGEMENT | Facility: CLINIC | Age: 64
End: 2019-08-12

## 2019-08-16 ENCOUNTER — TELEPHONE (OUTPATIENT)
Dept: NEPHROLOGY | Facility: CLINIC | Age: 64
End: 2019-08-16

## 2019-08-16 NOTE — TELEPHONE ENCOUNTER
efraín results received.  Collected 8/12/19.  Sent to be scanned in to chart.  Aleida Adams LPN  Nephrology  [phone]    Routed to Dr. Langford

## 2019-08-19 NOTE — PROGRESS NOTES
Urology Clinic    Selvin Miguel MD  Date of Service: 2019     Name: Reinaldo Delacruz  MRN: [id number]  Age: 64 year old  : 1955  Referring provider: Arthur Mederos     Assessment:  Reinaldo Delacruz  is a 64 year old male with a history of  multiple renal cysts first seen incidentally on CT in 2019 and not particularly concerning. Reviewed his CT with him today. It is reassuring that he is asymptomatic. Discussed ways to reduce his risks of kidney cancer including avoiding processed foods by eating a plant base diet and lowering his blood pressure.     Plan:  - follow up with either me in 2 years with BMP and CT with contrast. If experiences flank pain or gross hematuria then he will come back sooner.     Renal cysts not particularly concerning for cancer    Attestation:  This patient was seen and evaluated by me, with a scribe taking notes.  I have reviewed the note above and agree.  The physical exam and or any procedures were performed by me and the pertinant details are outlined below.       Selvin Miguel MD  Department of Urology  [facility]      ---------------------------------------------------------------------------------------------------------------------    Chief Complaint:   Chief Complaint   Patient presents with     RECHECK     renal mass        HPI:   Reinaldo Delacruz  is a 64 year old male with a history of multiple renal cysts first seen incidentally on CT in 2019. Mother with a history of renal cyst but lived to 90 (never on dialysis). His father was born with a weak kidney and later in life needed dialysis (unsure if there were cysts). Sister with no kidney issues.     History of gross hematuria secondary to large prostate and bladder stone. He is s/p holmium laser enucleation where 86 grams of tissue was removed. Denies gross hematuria or flank pain currently.     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 Denies lumps or bumps. No history of  "retroperitoneal surgery. He does have swelling in his feet when he drives for several hours.     Recently he has had higher blood pressure. Today, his blood pressure was 149/92.     PSA in 2017: 7.6   MRI (Date 05/20/2019):  Negative for lesions.   PIRADS score 2  PSA volume 116  PSA Density 0.07    Review of Systems:   Pertinent items are noted in HPI or as below, remainder of complete ROS is negative.      Active Medications:   No current outpatient medications on file.      Allergies:   Patient has no known allergies.      Past Medical History:  Benign prostatic hyperplasia   Kidney stones      Past Surgical History:  Holium laser enucleation of prostat- 06/2019     Family History:   No pertinent family history       Social History:   The patient was alone   Smoking Status: never   Smokeless Tobacco: never    Alcohol Use: yes; 0-1 drinks per week      Physical Exam:   BP (!) 149/92   Pulse 71   Ht 1.803 m (5' 11\")   Wt 83.7 kg (184 lb 9.6 oz)   BMI 25.75 kg/m     Body mass index is 25.75 kg/m .  General: Alert, no acute distress, oriented  HENT: Good dentition  Lungs: No respiratory distress, or pursed lip breathing  Heart: No obvious jugular venous distension present  Abdomen: Soft, nontender, no organomegaly or masses  Musculoskeletal: Extremities normal, no peripheral edema  Skin: No suspicious lesions or rashes  Neuro: Alert, oriented, speech and mentation normal  Psych: Affect and mood normal  Gait: Normal    Imaging:   I have personally reviewed the results of the below imaging studies. The results were discussed with the patient.     Results for orders placed or performed during the hospital encounter of 05/20/19   MR Prostate wo & w Contrast    Narrative    MRI PROSTATE: 5/20/2019 3:32 PM    CLINICAL HISTORY: Prostate screen, PSA > 3, post repeat (PSA, CAROL,  benign workup); Elevated prostate specific antigen (PSA)    Most Recent PSA: Unknown ug/L    Comparison: CT 3/12/2019.    TECHNIQUE:  The " following sequences were obtained: High-resolution axial  T2-weighted, coronal T2-weighted, 3D volumetric T2-weighted, axial  pre-contrast T1, axial diffusion-weighted, axial apparent diffusion  coefficient and axial dynamic contrast-enhanced T1. Postcontrast  images were evaluated on a separate workstation to evaluate dynamic  contrast enhancement. The technique of this exam is PI-RADS v2.1  compliant. Contrast dose: 10mL Gadavist    FINDINGS:  Size: 7.6 x 4.8 x 6.1 cm. 116 grams  Hemorrhage: Absent  Peripheral zone: Compressed and homogeneous on T2 signal on  T2-weighted images. No suspicious lesions identified.  Transition zone: Enlarged with BPH changes. Transition zone nodules  which are circumscribed or mostly encapsulated without diffusion  restriction.  PI-RADS 2.  No highly suspicious nodules.  Neurovascular bundles: No suspicion of involvement by malignancy  Seminal vesicles: Not involved by tumor.  Lymph nodes: No adenopathy.  Bones: No suspicious lesions.  Other pelvic organs: No additional findings.        Impression    IMPRESSION:  1. Based on the most suspicious abnormality, this exam is  characterized as PIRADS 2 - Clinically significant cancer is unlikely  to be present.    2. No suspicious adenopathy or evidence of pelvic metastases.      PIRADS? v2.1 Assessment Categories   PIRADS 1: Very low (clinically significant cancer is highly unlikely  to be present)   PIRADS 2: Low (clinically significant cancer is unlikely to be  present)   PIRADS 3: Intermediate (the presence of clinically significant cancer  is equivocal)   PIRADS 4: High (clinically significant cancer is likely to be present)    PIRADS 5: Very high (clinically significant cancer is highly likely to  be present)          SATYA PLASCENCIA MD       Laboratory:   I personally reviewed all applicable laboratory data and went over findings with patient  Significant for:    CBC RESULTS:  Recent Labs   Lab Test 06/07/19  0525 06/06/19 2055    WBC 7.9 9.5   HGB 11.4* 12.4*    217     BMP RESULTS:  Recent Labs   Lab Test 06/07/19  0525 06/06/19  2055    141   POTASSIUM 4.2 4.5   CHLORIDE 108 110*   CO2 27 25   ANIONGAP 3 6   * 120*   BUN 19 23   CR 0.91 1.13   GFRESTIMATED 89 69   GFRESTBLACK >90 79   JEAN MARIE 7.7* 8.3*     UA RESULTS:   Recent Labs   Lab Test 07/23/19  1100 05/17/19  0849   SG 1.018 1.017   URINEPH 5.0 5.0   NITRITE Negative Negative   RBCU 75* 65*   WBCU 37* 2     PSA RESULTS:   PSA   Date Value Ref Range Status   07/23/2019 1.23 0 - 4 ug/L Final     Comment:     Assay Method:  Chemiluminescence using Siemens Vista analyzer         Scribe Disclosure:  Pebbles FALK, am serving as a scribe to document services personally performed by Selvin Miguel MD at this visit, based upon the provider's statements to me. All documentation has been reviewed by the aforementioned provider prior to being entered into the official medical record.     Answers for HPI/ROS submitted by the patient on 8/22/2019   PHQ9 TOTAL SCORE: 3

## 2019-08-22 ENCOUNTER — OFFICE VISIT (OUTPATIENT)
Dept: UROLOGY | Facility: CLINIC | Age: 64
End: 2019-08-22
Payer: COMMERCIAL

## 2019-08-22 VITALS
DIASTOLIC BLOOD PRESSURE: 92 MMHG | SYSTOLIC BLOOD PRESSURE: 149 MMHG | HEIGHT: 71 IN | WEIGHT: 184.6 LBS | BODY MASS INDEX: 25.84 KG/M2 | HEART RATE: 71 BPM

## 2019-08-22 DIAGNOSIS — N28.1 ACQUIRED CYST OF KIDNEY: Primary | ICD-10-CM

## 2019-08-22 ASSESSMENT — PATIENT HEALTH QUESTIONNAIRE - PHQ9
SUM OF ALL RESPONSES TO PHQ QUESTIONS 1-9: 3
SUM OF ALL RESPONSES TO PHQ QUESTIONS 1-9: 3

## 2019-08-22 ASSESSMENT — PAIN SCALES - GENERAL: PAINLEVEL: NO PAIN (0)

## 2019-08-22 ASSESSMENT — MIFFLIN-ST. JEOR: SCORE: 1654.47

## 2019-08-22 NOTE — LETTER
2019       RE: Reinaldo Delacruz  [address] [facility] [id number]     Dear Colleague,    Thank you for referring your patient, Reinaldo Delacruz, to the [facility] UROLOGY AND INST FOR PROSTATE AND UROLOGIC CANCERS at [facility]. Please see a copy of my visit note below.      Urology Clinic    Selvin Miguel MD  Date of Service: 2019     Name: Reinaldo Delacruz  MRN: [id number]  Age: 64 year old  : 1955  Referring provider: Arthur Mederos     Assessment:  Reinaldo Delacruz  is a 64 year old male with a history of  multiple renal cysts first seen incidentally on CT in 2019 and not particularly concerning. Reviewed his CT with him today. It is reassuring that he is asymptomatic. Discussed ways to reduce his risks of kidney cancer including avoiding processed foods by eating a plant base diet and lowering his blood pressure.     Plan:  - follow up with either me in 2 years with BMP and CT with contrast. If experiences flank pain or gross hematuria then he will come back sooner.     Renal cysts not particularly concerning for cancer    Attestation:  This patient was seen and evaluated by me, with a scribe taking notes.  I have reviewed the note above and agree.  The physical exam and or any procedures were performed by me and the pertinant details are outlined below.       Selvin Miguel MD  Department of Urology  [facility]      ---------------------------------------------------------------------------------------------------------------------    Chief Complaint:   Chief Complaint   Patient presents with     RECHECK     renal mass        HPI:   Reinaldo Delacruz  is a 64 year old male with a history of multiple renal cysts first seen incidentally on CT in 2019. Mother with a history of renal cyst but lived to 90 (never on dialysis). His father was born with a weak kidney and later in life needed dialysis (unsure if there were cysts). Sister  "with no kidney issues.     History of gross hematuria secondary to large prostate and bladder stone. He is s/p holmium laser enucleation where 86 grams of tissue was removed. Denies gross hematuria or flank pain currently.     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 Denies lumps or bumps. No history of retroperitoneal surgery. He does have swelling in his feet when he drives for several hours.     Recently he has had higher blood pressure. Today, his blood pressure was 149/92.     PSA in 2017: 7.6   MRI (Date 05/20/2019):  Negative for lesions.   PIRADS score 2  PSA volume 116  PSA Density 0.07    Review of Systems:   Pertinent items are noted in HPI or as below, remainder of complete ROS is negative.      Active Medications:   No current outpatient medications on file.      Allergies:   Patient has no known allergies.      Past Medical History:  Benign prostatic hyperplasia   Kidney stones      Past Surgical History:  Holium laser enucleation of prostat- 06/2019     Family History:   No pertinent family history       Social History:   The patient was alone   Smoking Status: never   Smokeless Tobacco: never    Alcohol Use: yes; 0-1 drinks per week      Physical Exam:   BP (!) 149/92   Pulse 71   Ht 1.803 m (5' 11\")   Wt 83.7 kg (184 lb 9.6 oz)   BMI 25.75 kg/m      Body mass index is 25.75 kg/m .  General: Alert, no acute distress, oriented  HENT: Good dentition  Lungs: No respiratory distress, or pursed lip breathing  Heart: No obvious jugular venous distension present  Abdomen: Soft, nontender, no organomegaly or masses  Musculoskeletal: Extremities normal, no peripheral edema  Skin: No suspicious lesions or rashes  Neuro: Alert, oriented, speech and mentation normal  Psych: Affect and mood normal  Gait: Normal    Imaging:   I have personally reviewed the results of the below imaging studies. The results were discussed with the patient.     Results for orders placed or performed during the hospital " encounter of 05/20/19   MR Prostate wo & w Contrast    Narrative    MRI PROSTATE: 5/20/2019 3:32 PM    CLINICAL HISTORY: Prostate screen, PSA > 3, post repeat (PSA, CAROL,  benign workup); Elevated prostate specific antigen (PSA)    Most Recent PSA: Unknown ug/L    Comparison: CT 3/12/2019.    TECHNIQUE:  The following sequences were obtained: High-resolution axial  T2-weighted, coronal T2-weighted, 3D volumetric T2-weighted, axial  pre-contrast T1, axial diffusion-weighted, axial apparent diffusion  coefficient and axial dynamic contrast-enhanced T1. Postcontrast  images were evaluated on a separate workstation to evaluate dynamic  contrast enhancement. The technique of this exam is PI-RADS v2.1  compliant. Contrast dose: 10mL Gadavist    FINDINGS:  Size: 7.6 x 4.8 x 6.1 cm. 116 grams  Hemorrhage: Absent  Peripheral zone: Compressed and homogeneous on T2 signal on  T2-weighted images. No suspicious lesions identified.  Transition zone: Enlarged with BPH changes. Transition zone nodules  which are circumscribed or mostly encapsulated without diffusion  restriction.  PI-RADS 2.  No highly suspicious nodules.  Neurovascular bundles: No suspicion of involvement by malignancy  Seminal vesicles: Not involved by tumor.  Lymph nodes: No adenopathy.  Bones: No suspicious lesions.  Other pelvic organs: No additional findings.        Impression    IMPRESSION:  1. Based on the most suspicious abnormality, this exam is  characterized as PIRADS 2 - Clinically significant cancer is unlikely  to be present.    2. No suspicious adenopathy or evidence of pelvic metastases.      PIRADS? v2.1 Assessment Categories   PIRADS 1: Very low (clinically significant cancer is highly unlikely  to be present)   PIRADS 2: Low (clinically significant cancer is unlikely to be  present)   PIRADS 3: Intermediate (the presence of clinically significant cancer  is equivocal)   PIRADS 4: High (clinically significant cancer is likely to be  present)    PIRADS 5: Very high (clinically significant cancer is highly likely to  be present)          SATYA PLASCENCIA MD       Laboratory:   I personally reviewed all applicable laboratory data and went over findings with patient  Significant for:    CBC RESULTS:  Recent Labs   Lab Test 06/07/19 0525 06/06/19 2055   WBC 7.9 9.5   HGB 11.4* 12.4*    217     BMP RESULTS:  Recent Labs   Lab Test 06/07/19 0525 06/06/19 2055    141   POTASSIUM 4.2 4.5   CHLORIDE 108 110*   CO2 27 25   ANIONGAP 3 6   * 120*   BUN 19 23   CR 0.91 1.13   GFRESTIMATED 89 69   GFRESTBLACK >90 79   JEAN MARIE 7.7* 8.3*     UA RESULTS:   Recent Labs   Lab Test 07/23/19  1100 05/17/19  0849   SG 1.018 1.017   URINEPH 5.0 5.0   NITRITE Negative Negative   RBCU 75* 65*   WBCU 37* 2     PSA RESULTS:   PSA   Date Value Ref Range Status   07/23/2019 1.23 0 - 4 ug/L Final     Comment:     Assay Method:  Chemiluminescence using Siemens Vista analyzer         Scribe Disclosure:  I, Pebbles Mackenzie, am serving as a scribe to document services personally performed by Selvin Miguel MD at this visit, based upon the provider's statements to me. All documentation has been reviewed by the aforementioned provider prior to being entered into the official medical record.     Answers for HPI/ROS submitted by the patient on 8/22/2019   PHQ9 TOTAL SCORE: 3      Again, thank you for allowing me to participate in the care of your patient.      Sincerely,    Selvin Miguel MD

## 2020-03-11 ENCOUNTER — HEALTH MAINTENANCE LETTER (OUTPATIENT)
Age: 65
End: 2020-03-11

## 2020-07-13 ENCOUNTER — PRE VISIT (OUTPATIENT)
Dept: UROLOGY | Facility: CLINIC | Age: 65
End: 2020-07-13

## 2020-07-17 ENCOUNTER — TELEPHONE (OUTPATIENT)
Dept: UROLOGY | Facility: CLINIC | Age: 65
End: 2020-07-17

## 2020-07-21 ENCOUNTER — VIRTUAL VISIT (OUTPATIENT)
Dept: UROLOGY | Facility: CLINIC | Age: 65
End: 2020-07-21
Payer: COMMERCIAL

## 2020-07-21 DIAGNOSIS — N40.0 ENLARGED PROSTATE: Primary | ICD-10-CM

## 2020-07-21 ASSESSMENT — PAIN SCALES - GENERAL: PAINLEVEL: NO PAIN (0)

## 2020-07-21 NOTE — LETTER
7/21/2020       RE: Reinaldo Delacruz  [address] [facility] [address]     Dear Colleague,    Thank you for referring your patient, Reinaldo Delacruz, to the [facility] UROLOGY AND INST FOR PROSTATE AND UROLOGIC CANCERS at [facility]. Please see a copy of my visit note below.    Reinaldo Delacruz is a 65 year old male who is being evaluated via a billable telephone visit.               UROLOGY TELEPHONE FOLLOW-UP NOTE           Chief Complaint:   HoLEP Follow-up         Interval Update    Reinaldo Delacruz is a very pleasant 66 yo M with hx of large gland BPH.  One year ago he underwent HoLEP with 85 gm benign tissue removed.  He also has a hx of polycystic kidneys, has been screened for aneurysm.    Today notes: Reports no issues with voiding, strong stream, no hematuria, dysuria, leakage or straining.  Very pleased.        Labs and Pathology:    I personally reviewed all applicable laboratory data and went over findings with patient  Significant for:    CBC RESULTS:  Recent Labs   Lab Test 06/07/19  0525 06/06/19 2055   WBC 7.9 9.5   HGB 11.4* 12.4*    217        BMP RESULTS:  Recent Labs   Lab Test 06/07/19  0525 06/06/19 2055    141   POTASSIUM 4.2 4.5   CHLORIDE 108 110*   CO2 27 25   ANIONGAP 3 6   * 120*   BUN 19 23   CR 0.91 1.13   GFRESTIMATED 89 69   GFRESTBLACK >90 79   JEAN MARIE 7.7* 8.3*       UA RESULTS:   Recent Labs   Lab Test 07/23/19  1100 05/17/19  0849   SG 1.018 1.017   URINEPH 5.0 5.0   NITRITE Negative Negative   RBCU 75* 65*   WBCU 37* 2       PSA RESULTS  PSA   Date Value Ref Range Status   07/23/2019 1.23 0 - 4 ug/L Final     Comment:     Assay Method:  Chemiluminescence using Siemens Vista analyzer           Imaging:    I personally reviewed all applicable imaging and went over the below findings with patient.    Results for orders placed or performed during the hospital encounter of 05/20/19   MR Prostate wo & w Contrast    Narrative    MRI  PROSTATE: 5/20/2019 3:32 PM    CLINICAL HISTORY: Prostate screen, PSA > 3, post repeat (PSA, CAROL,  benign workup); Elevated prostate specific antigen (PSA)    Most Recent PSA: Unknown ug/L    Comparison: CT 3/12/2019.    TECHNIQUE:  The following sequences were obtained: High-resolution axial  T2-weighted, coronal T2-weighted, 3D volumetric T2-weighted, axial  pre-contrast T1, axial diffusion-weighted, axial apparent diffusion  coefficient and axial dynamic contrast-enhanced T1. Postcontrast  images were evaluated on a separate workstation to evaluate dynamic  contrast enhancement. The technique of this exam is PI-RADS v2.1  compliant. Contrast dose: 10mL Gadavist    FINDINGS:  Size: 7.6 x 4.8 x 6.1 cm. 116 grams  Hemorrhage: Absent  Peripheral zone: Compressed and homogeneous on T2 signal on  T2-weighted images. No suspicious lesions identified.  Transition zone: Enlarged with BPH changes. Transition zone nodules  which are circumscribed or mostly encapsulated without diffusion  restriction.  PI-RADS 2.  No highly suspicious nodules.  Neurovascular bundles: No suspicion of involvement by malignancy  Seminal vesicles: Not involved by tumor.  Lymph nodes: No adenopathy.  Bones: No suspicious lesions.  Other pelvic organs: No additional findings.        Impression    IMPRESSION:  1. Based on the most suspicious abnormality, this exam is  characterized as PIRADS 2 - Clinically significant cancer is unlikely  to be present.    2. No suspicious adenopathy or evidence of pelvic metastases.      PIRADS? v2.1 Assessment Categories   PIRADS 1: Very low (clinically significant cancer is highly unlikely  to be present)   PIRADS 2: Low (clinically significant cancer is unlikely to be  present)   PIRADS 3: Intermediate (the presence of clinically significant cancer  is equivocal)   PIRADS 4: High (clinically significant cancer is likely to be present)    PIRADS 5: Very high (clinically significant cancer is highly likely to  be  present)          SATYA PLASCENCIA MD              Assessment/Plan   65 year old male with hx of large gland BPH  -Recommend update to PSA  -If wnl can continue routine f/u with PMD         Past Medical History:     Past Medical History:   Diagnosis Date     BPH (benign prostatic hyperplasia)      Kidney stones             Past Surgical History:     Past Surgical History:   Procedure Laterality Date     LASER HOLMIUM ENUCLEATION PROSTATE N/A 6/6/2019    Procedure: Cystolithopaxy with Holmium Laser Enucleation Of The Prostate;  Surgeon: Arthur Mederos MD;  Location: [facility] OR            Medications     No current outpatient medications on file.     No current facility-administered medications for this visit.             Family History:   No family history on file.         Social History:     Social History     Socioeconomic History     Marital status:      Spouse name: Not on file     Number of children: Not on file     Years of education: Not on file     Highest education level: Not on file   Occupational History     Not on file   Social Needs     Financial resource strain: Not on file     Food insecurity     Worry: Not on file     Inability: Not on file     Transportation needs     Medical: Not on file     Non-medical: Not on file   Tobacco Use     Smoking status: Never Smoker     Smokeless tobacco: Never Used   Substance and Sexual Activity     Alcohol use: Yes     Comment: 0 - 1 beer week     Drug use: Never     Sexual activity: Not on file   Lifestyle     Physical activity     Days per week: Not on file     Minutes per session: Not on file     Stress: Not on file   Relationships     Social connections     Talks on phone: Not on file     Gets together: Not on file     Attends [employer] service: Not on file     Active member of club or organization: Not on file     Attends meetings of clubs or organizations: Not on file     Relationship status: Not on file     Intimate partner violence     Fear of  "current or ex partner: Not on file     Emotionally abused: Not on file     Physically abused: Not on file     Forced sexual activity: Not on file   Other Topics Concern     Parent/sibling w/ CABG, MI or angioplasty before 65F 55M? No   Social History Narrative     Not on file            Allergies:   Patient has no known allergies.         Review of Systems:  From intake questionnaire   Negative 14 system review except as noted on HPI, nurse's note.        CC:  Abdiel Justice         The patient has been notified of following:     \"This telephone visit will be conducted via a call between you and your physician/provider. We have found that certain health care needs can be provided without the need for a physical exam.  This service lets us provide the care you need with a short phone conversation.  If a prescription is necessary we can send it directly to your pharmacy.  If lab work is needed we can place an order for that and you can then stop by our lab to have the test done at a later time.    Telephone visits are billed at different rates depending on your insurance coverage. During this emergency period, for some insurers they may be billed the same as an in-person visit.  Please reach out to your insurance provider with any questions.    If during the course of the call the physician/provider feels a telephone visit is not appropriate, you will not be charged for this service.\"    Patient has given verbal consent for Telephone visit?  Yes    What phone number would you like to be contacted at? [phone]    How would you like to obtain your AVS? [facility]    Phone call duration: 7 minutes    Arthur Mederos MD          "

## 2020-07-21 NOTE — NURSING NOTE
Chief Complaint   Patient presents with     RECHECK     BPH/stone/renal cyst follow up     Muna Webb, CMA

## 2020-07-21 NOTE — PROGRESS NOTES
Reinaldo Delacruz is a 65 year old male who is being evaluated via a billable telephone visit.               UROLOGY TELEPHONE FOLLOW-UP NOTE           Chief Complaint:   HoLEP Follow-up         Interval Update    Reinaldo Delacruz is a very pleasant 64 yo M with hx of large gland BPH.  One year ago he underwent HoLEP with 85 gm benign tissue removed.  He also has a hx of polycystic kidneys, has been screened for aneurysm.    Today notes: Reports no issues with voiding, strong stream, no hematuria, dysuria, leakage or straining.  Very pleased.        Labs and Pathology:    I personally reviewed all applicable laboratory data and went over findings with patient  Significant for:    CBC RESULTS:  Recent Labs   Lab Test 06/07/19  0525 06/06/19 2055   WBC 7.9 9.5   HGB 11.4* 12.4*    217        BMP RESULTS:  Recent Labs   Lab Test 06/07/19 0525 06/06/19 2055    141   POTASSIUM 4.2 4.5   CHLORIDE 108 110*   CO2 27 25   ANIONGAP 3 6   * 120*   BUN 19 23   CR 0.91 1.13   GFRESTIMATED 89 69   GFRESTBLACK >90 79   JEAN MARIE 7.7* 8.3*       UA RESULTS:   Recent Labs   Lab Test 07/23/19  1100 05/17/19  0849   SG 1.018 1.017   URINEPH 5.0 5.0   NITRITE Negative Negative   RBCU 75* 65*   WBCU 37* 2       PSA RESULTS  PSA   Date Value Ref Range Status   07/23/2019 1.23 0 - 4 ug/L Final     Comment:     Assay Method:  Chemiluminescence using Siemens Vista analyzer           Imaging:    I personally reviewed all applicable imaging and went over the below findings with patient.    Results for orders placed or performed during the hospital encounter of 05/20/19   MR Prostate wo & w Contrast    Narrative    MRI PROSTATE: 5/20/2019 3:32 PM    CLINICAL HISTORY: Prostate screen, PSA > 3, post repeat (PSA, CAROL,  benign workup); Elevated prostate specific antigen (PSA)    Most Recent PSA: Unknown ug/L    Comparison: CT 3/12/2019.    TECHNIQUE:  The following sequences were obtained: High-resolution axial  T2-weighted, coronal  T2-weighted, 3D volumetric T2-weighted, axial  pre-contrast T1, axial diffusion-weighted, axial apparent diffusion  coefficient and axial dynamic contrast-enhanced T1. Postcontrast  images were evaluated on a separate workstation to evaluate dynamic  contrast enhancement. The technique of this exam is PI-RADS v2.1  compliant. Contrast dose: 10mL Gadavist    FINDINGS:  Size: 7.6 x 4.8 x 6.1 cm. 116 grams  Hemorrhage: Absent  Peripheral zone: Compressed and homogeneous on T2 signal on  T2-weighted images. No suspicious lesions identified.  Transition zone: Enlarged with BPH changes. Transition zone nodules  which are circumscribed or mostly encapsulated without diffusion  restriction.  PI-RADS 2.  No highly suspicious nodules.  Neurovascular bundles: No suspicion of involvement by malignancy  Seminal vesicles: Not involved by tumor.  Lymph nodes: No adenopathy.  Bones: No suspicious lesions.  Other pelvic organs: No additional findings.        Impression    IMPRESSION:  1. Based on the most suspicious abnormality, this exam is  characterized as PIRADS 2 - Clinically significant cancer is unlikely  to be present.    2. No suspicious adenopathy or evidence of pelvic metastases.      PIRADS? v2.1 Assessment Categories   PIRADS 1: Very low (clinically significant cancer is highly unlikely  to be present)   PIRADS 2: Low (clinically significant cancer is unlikely to be  present)   PIRADS 3: Intermediate (the presence of clinically significant cancer  is equivocal)   PIRADS 4: High (clinically significant cancer is likely to be present)    PIRADS 5: Very high (clinically significant cancer is highly likely to  be present)          SATYA PLASCENCIA MD              Assessment/Plan   65 year old male with hx of large gland BPH  -Recommend update to PSA  -If wnl can continue routine f/u with PMD         Past Medical History:     Past Medical History:   Diagnosis Date     BPH (benign prostatic hyperplasia)      Kidney stones              Past Surgical History:     Past Surgical History:   Procedure Laterality Date     LASER HOLMIUM ENUCLEATION PROSTATE N/A 6/6/2019    Procedure: Cystolithopaxy with Holmium Laser Enucleation Of The Prostate;  Surgeon: Arthur Mederos MD;  Location: [facility] OR            Medications     No current outpatient medications on file.     No current facility-administered medications for this visit.             Family History:   No family history on file.         Social History:     Social History     Socioeconomic History     Marital status:      Spouse name: Not on file     Number of children: Not on file     Years of education: Not on file     Highest education level: Not on file   Occupational History     Not on file   Social Needs     Financial resource strain: Not on file     Food insecurity     Worry: Not on file     Inability: Not on file     Transportation needs     Medical: Not on file     Non-medical: Not on file   Tobacco Use     Smoking status: Never Smoker     Smokeless tobacco: Never Used   Substance and Sexual Activity     Alcohol use: Yes     Comment: 0 - 1 beer week     Drug use: Never     Sexual activity: Not on file   Lifestyle     Physical activity     Days per week: Not on file     Minutes per session: Not on file     Stress: Not on file   Relationships     Social connections     Talks on phone: Not on file     Gets together: Not on file     Attends Mu-ism service: Not on file     Active member of club or organization: Not on file     Attends meetings of clubs or organizations: Not on file     Relationship status: Not on file     Intimate partner violence     Fear of current or ex partner: Not on file     Emotionally abused: Not on file     Physically abused: Not on file     Forced sexual activity: Not on file   Other Topics Concern     Parent/sibling w/ CABG, MI or angioplasty before 65F 55M? No   Social History Narrative     Not on file            Allergies:   Patient has no known  "allergies.         Review of Systems:  From intake questionnaire   Negative 14 system review except as noted on HPI, nurse's note.        CC:  Abdiel Justice         The patient has been notified of following:     \"This telephone visit will be conducted via a call between you and your physician/provider. We have found that certain health care needs can be provided without the need for a physical exam.  This service lets us provide the care you need with a short phone conversation.  If a prescription is necessary we can send it directly to your pharmacy.  If lab work is needed we can place an order for that and you can then stop by our lab to have the test done at a later time.    Telephone visits are billed at different rates depending on your insurance coverage. During this emergency period, for some insurers they may be billed the same as an in-person visit.  Please reach out to your insurance provider with any questions.    If during the course of the call the physician/provider feels a telephone visit is not appropriate, you will not be charged for this service.\"    Patient has given verbal consent for Telephone visit?  Yes    What phone number would you like to be contacted at? [phone]    How would you like to obtain your AV[facility]? [facility]    Phone call duration: 7 minutes    Arthur Mederos MD      "

## 2020-07-23 ENCOUNTER — TELEPHONE (OUTPATIENT)
Dept: UROLOGY | Facility: CLINIC | Age: 65
End: 2020-07-23

## 2021-01-03 ENCOUNTER — HEALTH MAINTENANCE LETTER (OUTPATIENT)
Age: 66
End: 2021-01-03

## 2021-03-07 ENCOUNTER — HEALTH MAINTENANCE LETTER (OUTPATIENT)
Age: 66
End: 2021-03-07

## 2021-10-10 ENCOUNTER — HEALTH MAINTENANCE LETTER (OUTPATIENT)
Age: 66
End: 2021-10-10

## 2021-11-04 NOTE — PATIENT INSTRUCTIONS
Please follow up in 2 year with imaging before       It was a pleasure meeting with you today.  Thank you for allowing me and my team the privilege of caring for you today.  YOU are the reason we are here, and I truly hope we provided you with the excellent service you deserve.  Please let us know if there is anything else we can do for you so that we can be sure you are leaving completely satisfied with your care experience.           No

## 2022-03-26 ENCOUNTER — HEALTH MAINTENANCE LETTER (OUTPATIENT)
Age: 67
End: 2022-03-26

## 2022-09-18 ENCOUNTER — HEALTH MAINTENANCE LETTER (OUTPATIENT)
Age: 67
End: 2022-09-18

## 2023-05-07 ENCOUNTER — HEALTH MAINTENANCE LETTER (OUTPATIENT)
Age: 68
End: 2023-05-07

## (undated) DEVICE — LASER FIBER HOLMIUM END FIRE SLIMLINE EZ550 M0068408940

## (undated) DEVICE — CATH LASER URETERAL 7.1FRX40CM G17797  022403-7.1-40

## (undated) DEVICE — TUBING IRRIG CYSTO/BLADDER SET 81" LF 2C4040

## (undated) DEVICE — FILTER PIRANHA DISP 2228.901

## (undated) DEVICE — DRSG GAUZE 4X8" NON21842

## (undated) DEVICE — GLOVE PROTEXIS W/NEU-THERA 7.5  2D73TE75

## (undated) DEVICE — DRSG ABDOMINAL 07 1/2X8" 7197D

## (undated) DEVICE — JELLY LUBRICATING SURGILUBE 2OZ TUBE 0281-0205-02

## (undated) DEVICE — SPECIMEN TRAP TISSUE CONTAINER PIRANHA 2208120

## (undated) DEVICE — EVACUATOR BLADDER UROVAC LATEX M0067301250

## (undated) DEVICE — Device

## (undated) DEVICE — DRAPE MAYO STAND 23X54 8337

## (undated) DEVICE — LIGHT HANDLE X2

## (undated) DEVICE — CATH FOLEY 3WAY 22FR 30ML SIL 73022SI

## (undated) DEVICE — LINEN GOWN X4 5410

## (undated) DEVICE — SYR 70ML TOOMEY 041170

## (undated) DEVICE — TAPE DURAPORE ADVANCED PURPLE 3" 1590-3

## (undated) DEVICE — TUBING SET PIRANHA 41702208

## (undated) DEVICE — BLADE MORCELLATOR WOLF PIRANHA 4.75X385MM 49700103

## (undated) DEVICE — PAD CHUX UNDERPAD 30X36" P3036C

## (undated) DEVICE — TUBING SUCTION MEDI-VAC 1/4"X20' N620A

## (undated) DEVICE — LINEN TOWEL PACK X5 5464

## (undated) DEVICE — SUCTION MANIFOLD DORNOCH ULTRA CART UL-CL500

## (undated) DEVICE — SOL NACL 0.9% IRRIG 3000ML BAG 2B7477

## (undated) DEVICE — SOL NACL 0.9% IRRIG 1000ML BOTTLE 2F7124

## (undated) DEVICE — SOL WATER IRRIG 1000ML BOTTLE 2F7114

## (undated) DEVICE — SYR 50ML LL W/O NDL 309653

## (undated) DEVICE — BAG URINARY DRAIN LUBRISIL IC 4000ML LF 253509A

## (undated) DEVICE — SUCTION WATERBUG FLOOR PUDDLE VAC 9321

## (undated) DEVICE — SPECIMEN CONTAINER 5OZ STERILE 2600SA

## (undated) DEVICE — STRAP KNEE/BODY 31143004

## (undated) RX ORDER — FENTANYL CITRATE 50 UG/ML
INJECTION, SOLUTION INTRAMUSCULAR; INTRAVENOUS
Status: DISPENSED
Start: 2019-06-06

## (undated) RX ORDER — SODIUM CHLORIDE, SODIUM LACTATE, POTASSIUM CHLORIDE, CALCIUM CHLORIDE 600; 310; 30; 20 MG/100ML; MG/100ML; MG/100ML; MG/100ML
INJECTION, SOLUTION INTRAVENOUS
Status: DISPENSED
Start: 2019-06-06

## (undated) RX ORDER — CEFAZOLIN SODIUM 2 G/100ML
INJECTION, SOLUTION INTRAVENOUS
Status: DISPENSED
Start: 2019-06-06

## (undated) RX ORDER — HYDROMORPHONE HYDROCHLORIDE 1 MG/ML
INJECTION, SOLUTION INTRAMUSCULAR; INTRAVENOUS; SUBCUTANEOUS
Status: DISPENSED
Start: 2019-06-06